# Patient Record
Sex: FEMALE | Race: WHITE | NOT HISPANIC OR LATINO | Employment: FULL TIME | URBAN - METROPOLITAN AREA
[De-identification: names, ages, dates, MRNs, and addresses within clinical notes are randomized per-mention and may not be internally consistent; named-entity substitution may affect disease eponyms.]

---

## 2021-04-16 ENCOUNTER — BULK ORDERING (OUTPATIENT)
Dept: CASE MANAGEMENT | Facility: OTHER | Age: 31
End: 2021-04-16

## 2021-04-16 DIAGNOSIS — Z23 IMMUNIZATION DUE: ICD-10-CM

## 2023-03-27 ENCOUNTER — E-VISIT (OUTPATIENT)
Dept: FAMILY MEDICINE CLINIC | Facility: TELEHEALTH | Age: 33
End: 2023-03-27
Payer: MEDICAID

## 2023-03-27 PROCEDURE — BRIGHTMDVISIT: Performed by: NURSE PRACTITIONER

## 2023-03-27 NOTE — EXTERNAL PATIENT INSTRUCTIONS
View Doctor's Note     Diagnosis   Strep pharyngitis (strep throat)   My name is IMAN Joshua, and I'm a healthcare provider at [facility]. I reviewed your interview, and I see that you have strep throat. This can be a painful condition, but it responds well to treatment.   To prevent the spread of illness to others, I recommend that you stay home and away from other people as much as possible while you're sick. When you need to be around other people, consider wearing a face mask.   I've given you a doctor's note for 1 day.   Medications   Your pharmacy   [facility] [id number] [facility] [id number] [phone]     Prescription   Azithromycin (500mg): Take 1 tablet by mouth once a day for 5 days for infection. This medication is an antibiotic. Take it exactly as directed. You must finish the entire course of medication, even if you feel better after the first few days of treatment.    Start taking the antibiotics I've prescribed right away. You need to finish the entire course of antibiotics, even if you start to feel better before the pills run out.    Some people develop a yeast infection after taking antibiotics. If you get a yeast infection, you can treat it with antifungal creams or suppositories. These are available without a prescription at [facility] and many supermarkets.   About your diagnosis   Strep throat is an infection in the throat and tonsils caused by group A streptococcus bacteria. Strep throat is most often spread by droplets that are released into the air after an infected person coughs or sneezes. You can also get strep throat by sharing cups or utensils with an infected person.   Symptoms usually begin within 2 to 5 days after a person has been exposed. The pain from strep throat usually starts quickly and can be severe, especially when swallowing. Body aches and pains are common.   What to expect   If you follow this treatment plan, you should start to feel  "better within a few days.   When to seek care   Call us at [phone]   with any sudden or unexpected symptoms.    Symptoms that don't improve within 48 hours of starting antibiotics.    Symptoms that get better for a few days and then suddenly get worse.    Worsening fever.    Your throat pain becomes worse and makes swallowing extremely difficult or impossible.    Muffled voice (it may sound like you are talking with a mouthful of food).    Difficulty opening your mouth or jaw.    Stiff neck.    Joint pain, or swelling that moves from joint to joint.    Severe stomach pain.    Shortness of breath.    Nosebleed.    Severe fatigue.    A new rash.    Odd emotional or behavioral changes.    Uncontrollable body movements or \"jerkiness.\"    Severe chest pain.   Other treatment    Rest and drink plenty of water.    Choose throat-friendly liquids and foods, such as lemon tea, broth, applesauce, frozen yogurt, or popsicles.    Gargle with salt water several times a day to help with your throat pain.    Try cough drops and throat lozenges for extra relief.    Stir a teaspoon of honey into warm water or weak tea to help soothe a sore throat.    Avoid smoke and air pollution. Smoke can make infections worse.   Prevention    Avoid close contact with other people when you're sick.    Cover your mouth and nose when you cough or sneeze. Use a tissue or cough into your elbow. Make sure that used tissues go directly into the trash.    Avoid touching your eyes, nose, or mouth while you're sick.    Wash your hands often, especially after coughing, sneezing, or blowing your nose. If soap and water are not available, use an alcohol-based hand .    If you or someone in your home or workplace is sick, disinfect commonly used items. This includes door handles, tables, computers, remotes, and pens.    Coronavirus (COVID-19) information   Common symptoms of COVID-19 include fever, cough, shortness of breath, fatigue, muscle " or body aches, headaches, new loss of sense of taste or smell, sore throat, stuffy or runny nose, nausea or vomiting, and diarrhea. Most people who get COVID-19 have mild symptoms and can rest at home until they get better. Elderly people and those with chronic medical problems may be at risk for more serious complications.   FAQs about the COVID-19 vaccine   There are four authorized COVID-19 vaccines: [employer] Apoorva Vaccine (J&J/Apoorva), Moderna, Novavax, and Pfizer-BioNTech (Pfizer). The J&J/Apoorva and Novavax vaccines are approved for use in people aged 18 and older. The Moderna and Pfizer vaccines are approved for those aged 6 months and older. All four are available at no cost. Even if you don't have health insurance, you can still get the COVID-19 vaccine for free.   Which vaccine is the best? Which vaccine should I get?   All four vaccines are highly effective. Even if you get COVID-19 after being vaccinated, all of the vaccines help prevent severe disease, hospitalization, and complications.   Most people should get whichever vaccine is first available to them. However, women younger than 50 years old should consider the rare risk of blood clots with low platelets after vaccination with the J&J/Apoorva vaccine. This risk hasn't been seen with the other three vaccines.   Are the vaccines safe?   Yes. Hundreds of millions of people in the [place] have already safely received COVID-19 vaccines under the most intense safety monitoring in the history of the [place].   Do I need the vaccine if I've already had COVID?   Yes. Vaccination helps protect you even if you've already had COVID.   If you had COVID-19 and had symptoms, wait to get vaccinated until you've recovered and completed your isolation period.   If you tested positive for COVID-19 but did not have symptoms, you can get vaccinated after 5 full days have passed since you had a positive test, as long as you don't develop symptoms.   How many doses  of the vaccine do I need?   Visit www.cdc.gov/coronavirus/2019-ncov/vaccines/stay-up-to-date.html   to find out how to stay up to date with your COVID-19 vaccines.   I'm immunocompromised. How many doses of the vaccine do I need?   For information on how immunocompromised people can stay up to date with their COVID-19 vaccines, visit www.cdc.gov/coronavirus/2019-ncov/vaccines/recommendations/immuno.html  .   What are the common side effects of the vaccine?   A sore arm, tiredness, headache, and muscle pain may occur within two days of getting the vaccine and last a day or two. For the Moderna or Pfizer vaccines, side effects are more common after the second dose. People over the age of 55 are less likely to have side effects than younger people.   After I'm up to date on vaccines, can I still get or spread COVID?   Yes, you can still get COVID, but your disease should be milder. And your risk of serious illness, hospitalization, and complications will be much lower, especially if you're up to date. Unfortunately, you can still spread COVID if you've been vaccinated. That's why it's important to follow isolation guidelines if you get sick or test positive.   After I'm up to date on vaccines, can I go back to normal?   You should still wear a mask indoors in public if:    It's required by laws, rules, regulations, or local guidance.    You have a weakened immune system.    Your age puts you at increased risk of severe disease.    You have a medical condition that puts you at increased risk of severe disease.    Someone in your household has a weakened immune system, is at increased risk for severe disease, or is unvaccinated.    You're in an area of high transmission.   Where can I get a COVID-19 vaccine?   Visit [facility]'s website for more information. To find a COVID-19 vaccination site near you, visit www.vaccines.gov/  , call [phone]  , or text your zip code to [id number] ([employer]). Message and data rates  may apply.   What about travel?   Travel increases your risk of exposure to COVID-19. For more information, see www.cdc.gov/coronavirus/2019-ncov/travelers/index.html  .   I've had close contact with someone who has COVID. Do I need to quarantine, and if so, for how long?   For the most current answer, including a calculator to determine whether you need to stay home and for how long, visit www.cdc.gov/coronavirus/2019-ncov/your-health/quarantine-isolation.html  .   I've tested positive for COVID. How long do I need to isolate?   For the latest recommendations, including a calculator to determine how long you need to stay home, visit www.cdc.gov/coronavirus/2019-ncov/your-health/quarantine-isolation.html  .   What if I develop symptoms that might be from COVID?   For the latest recommendations on what to do if you're sick, including when to seek emergency care, visit www.cdc.gov/coronavirus/2019-ncov/if-you-are-sick/steps-when-sick.html  .    Flu vaccine information   Who should get a flu vaccine?   Everyone 6 months of age and older should get a yearly flu vaccine.   When should I get vaccinated?   You should get a flu vaccine by the end of October. Once you're vaccinated, it takes about two weeks for antibodies to develop and protect you against the flu. That's why it's important to get vaccinated as soon as possible.   After October, is it too late to get vaccinated?   No. You should still get vaccinated. As long as the flu viruses are still in your community, flu vaccines will remain available, even into January of next year or later.   Why do I need a flu vaccine EVERY year?   Flu viruses are constantly changing, so flu vaccines are usually updated from one season to the next. Your protection from the flu vaccine also lessens over time.   Is the flu vaccine safe?   Yes. Over the last 50 years, hundreds of millions of Americans have safely received the flu vaccines.   What are the side effects of flu vaccines?    You CANNOT get the flu from a flu vaccine. Common side effects of the flu shot include soreness, redness and/or swelling where the shot was given, low grade fever, and aches. Common side effects of the nasal spray flu vaccine for adults include runny nose, headaches, sore throat, and cough. For children, side effects include wheezing, vomiting, muscle aches, and fever.   Does the flu vaccine increase your risk of getting COVID-19?   No. There is no evidence that getting a flu vaccine increases your risk of getting COVID-19.   Is it safe to get the flu vaccine along with a COVID-19 vaccine?   Yes. It's safe to get the flu vaccine with a COVID-19 vaccine or booster.   Contact your healthcare provider TODAY for details on when and where to get your flu vaccine.   Your provider   Your diagnosis was provided by IMAN Joshua, a member of your trusted care team at [facility].   If you have any questions, call us at [phone]  .   View Doctor's Note     Expires on 04/26/23

## 2023-03-27 NOTE — E-VISIT TREATED
Chief Complaint: Cold, flu, COVID, sinus, hay fever, or seasonal allergies   Patient introduction   Patient is 33-year-old female with fever (which may have resolved; see below), sore throat, and headache that started 3 to 5 days ago. Regarding date of symptom onset, patient writes: 03/24/23.   COVID-19 testing history, vaccination status, and exposure:    Has not been tested for COVID-19 since symptom onset.    Patient took a self-test during the interview. Test result was negative.    Received 2 doses of the COVID-19 vaccine (Moderna, Moderna). Received their most recent dose of the vaccine more than 14 days ago.    No known exposure to a person with a confirmed or suspected case of COVID-19.    No travel outside local community within the last 14 days.   Patient requests a 1-day excuse note.   General presentation   Symptoms came on gradually.   Fever:    Has had 1 to 3 days of measured fever.    Has had current measured fever since initial symptom onset.    Highest temperature of 100.4F to 101.5F.   Sinus and nasal symptoms:    No nasal or sinus congestion.    No nasal discharge.    No itchy nose or sneezing.   Throat symptoms:    Sore throat.    Has had recent strep exposure.    Patient thinks they have strep.    Has discomfort when swallowing but can swallow liquids and solid foods.    No muffled voice.   Head and body aches:    Headache described as mild (1 to 3 on a scale of 1 to 10).    No sweats.    No chills.    No myalgia.    No fatigue.   Cough:    No cough.   Wheezing and shortness of breath:    No COPD diagnosis.    No asthma diagnosis.    No wheezing.    No shortness of breath.   Chest pain:    No chest pain.   Ear symptoms:    Current symptoms include pain in the ear(s).   Dizziness:    No dizziness.   Allergies:    Patient has known seasonal allergies.    Patient does not think symptoms are allergy-related.   Flu exposure:    No recent known exposure to a person with a confirmed flu diagnosis.     Has not had a flu vaccine this season.   Not taking any over-the-counter medications for current symptoms.   Review of red flags/alarm symptoms:    No changes in alertness or awareness.    No symptoms suggesting airway obstruction.    No muffled voice.    No symptoms suggesting intracranial hemorrhage.    No fever above 100.4F lasting longer than 4 days.    No decreased urination.   Risk factors for antibiotic resistance:    Close contact with a child in .   Pregnancy/menstrual status/breastfeeding:    Not pregnant.    Not breastfeeding.    Regarding date of last menstrual period, patient writes: Started bleeding on march 13 and haven't really stopped. I just had the mirena IUD placed march 1st. Last period before that was feb 22nd. .   Self-exam:    Height: 177 centimeters    Weight: 68.0 kilograms    No difficulty moving their chin toward their chest.    Tonsillar edema.    Purulent tonsils.    No palatal petechiae.    Able to open mouth normally.    Swollen/painful neck lymph nodes.   Recent antibiotic use:    Has not taken antibiotics for similar symptoms within the past month.   Current medications   Currently taking Vyvanse, topiramate, Mirena, Spironolactone, and Magnesium.   Medication allergies    Corticosteroids.    Fluoroquinolones.    Penicillins.   Medication contraindication review   No history of anaphylactic reaction to beta-lactam antibiotics; aspirin triad; blood dyscrasia; bone marrow depression; catecholamine-releasing paraganglioma; coronary artery disease; coagulation disorder; congenital long QT syndrome; depression; electrolyte abnormalities; fungal infection; GI bleeding; GI obstruction; G6PD deficiency; heart arrhythmia; hypertension; mononucleosis; myasthenia; recent myocardial infarction; NSAID-induced asthma/urticaria; Parkinson's disease; pheochromocytoma; porphyria; Reye syndrome; seizure disorder; ulcerative colitis; and urinary retention.   No history of  metoclopramide-associated dystonic reaction and tardive dyskinesia.   No known history of azithromycin-associated cholestatic jaundice or hepatic impairment.   Past medical history   Immune conditions: No immunocompromising conditions. No history of cancer.   Social history   Never smoked tobacco.   Assessment   Strep pharyngitis. Ruled out: Traumatic laryngitis.   This is the likely diagnosis based on patient's interview responses and symptoms, including:    Sore throat.    No cough.    Current fever    Mild to moderate headache.    Purulent tonsils.    Recent strep exposure.    Swollen/painful neck lymph nodes.   Plan   Medications:    azithromycin 500 mg tablet RX 500mg 1 tab PO qd 5d for infection. This medication is an antibiotic. Take it exactly as directed. You must finish the entire course of medication, even if you feel better after the first few days of treatment. Amount is 5 tab.   The patient's prescription will be sent to:   [id number]   [address] [address]   Phone: [phone]     Fax: [phone]   Other:   Patient was given an excuse note for 1 day.   Education:    Condition and causes    Prevention    Treatment and self-care    When to call provider   ----------   Electronically signed by IMAN Joshua on 2023-03-27 at 11:15AM   ----------   Patient Interview Transcript:   Please carefully consider each question and answer as best you can. This helps your provider give you the best care. Which of these symptoms are bothering you? Select all that apply.    Fever    Sore throat    Headache   Not selected:    Cough    Shortness of breath    Stuffed-up nose or sinuses    Runny nose    Itchy or watery eyes    Itchy nose or sneezing    Loss of smell or taste    Hoarse voice or loss of voice    Sweats    Chills    Muscle or body aches    Fatigue or tiredness    Nausea or vomiting    Diarrhea    I don't have any of these symptoms   Since your current symptoms  "started, have you had a viral test for COVID-19? - This includes home self-tests as well as nose swab or saliva tests done at a doctor's office, lab, or testing site. - It does NOT include antibody tests, which use a blood sample to test for past infection. Select one.    No   Not selected:    Yes   Taking a home COVID test can help your provider give you the best care. If you have a COVID test kit at home, take the test now before continuing with this interview. Do you have a COVID test kit at home? Select one.    Yes, and I'll take a test now   Not selected:    Yes, but I prefer not to take a test now    No, I don't have a test kit   What is the result of the COVID-19 home test you just took? Select one.    Negative   Not selected:    Positive   Have you gotten the COVID-19 vaccine? Select one.    Yes   Not selected:    No   How many total doses of the COVID-19 vaccine have you gotten? This includes boosters as well as additional doses for those who are immunocompromised. Select one.    2 doses   Not selected:    1 dose    3 doses    4 doses    5 doses   1st dose    Moderna   Not selected:    J&J/Apoorva    Novavax    Pfizer   2nd dose    Moderna   Not selected:    J&J/Apoorva    Novavax    Pfizer   When did you get your most recent dose of the COVID-19 vaccine?    More than 14 days ago   Not selected:    Less than 48 hours (2 days) ago    48 to 72 hours (3 days) ago    3 to 5 days ago    5 to 7 days ago    7 to 14 days ago   In the last 14 days, have you traveled outside of your local community? This includes travel by car, RV, bus, train, or plane. Travel increases your chances of getting and spreading COVID-19. Select one.    No   Not selected:    Yes   In the last 14 days, have you had close contact with someone who has coronavirus (COVID-19)? \"Close contact\" means any of these: - Living in the same household as someone with COVID-19. - Caring for someone with COVID-19. - Being within 6 feet of someone with " COVID-19 for a total of at least 15 minutes over a 24-hour period. For example, three 5-minute exposures for a total of 15 minutes. - Being in direct contact with respiratory droplets from someone with COVID-19 (being coughed on, kissing, sharing utensils). Select one.    No, not that I know of   Not selected:    Yes, a confirmed case    Yes, a suspected case   When did your current symptoms start? Select one.    3 to 5 days ago   Not selected:    Less than 48 hours ago    6 to 9 days ago    10 to 14 days ago    2 to 3 weeks ago    3 to 4 weeks ago    More than a month ago   Do you know the exact date your symptoms started? If so, enter the date as MM/DD/YY. Select one.    Yes (specify): 03/24/23   Not selected:    No   Did your symptoms come on suddenly or gradually? Select one.    Gradually   Not selected:    Suddenly    I'm not sure   You mentioned having a fever. Do you have a fever now? Select one.    Yes, and I've had one since my symptoms started   Not selected:    Yes, but I didn't have one when my symptoms started    No, it's gone now   Did you take your temperature with a thermometer? Select one.    Yes   Not selected:    No, but it felt mild    No, but it felt high   What was the highest reading on the thermometer? Select one.    100.4 to 101.5F   Not selected:    Below 100.4F    101.6 to 101.9F    102.0 to 103.0F    Above 103.0F   How long have you had a fever? Select one.    1 to 3 days   Not selected:    Less than 24 hours    4 or more days   You mentioned having a headache. On a scale of 1 to 10, how severe is your headache pain? Select one.    Mild (1 to 3)   Not selected:    Moderate (4 to 6)    Severe (7 to 9)    Unbearable (10)    The worst headache of my life (10+)   Can you swallow liquids and solid foods? A sore throat may be painful when swallowing, but it shouldn't prevent you from swallowing. Select one.    Yes, but it's uncomfortable   Not selected:    Yes, with ease    Yes, but it's  "painful    It's hard to swallow anything because it feels like liquids and food get stuck in my throat    No, I can't swallow anything, liquid or solid foods   Since your symptoms started, have you felt dizzy? Select one.    No   Not selected:    Yes, but I can continue with my regular daily activities    Yes, and it makes it hard to stand, walk, or do daily activities   Do you have chest pain? You might also feel it as discomfort, aching, tightness, or squeezing in the chest. Select one.    No   Not selected:    Yes   Have you urinated at least 3 times in the last 24 hours? Select one.    Yes   Not selected:    No   Changes in alertness or awareness may mean you need emergency care. Since your symptoms started, have you had any of these? Select all that apply.    None of the above   Not selected:    Confusion    Slurred speech    Not knowing where you are or what day it is    Difficulty staying conscious    Fainting or passing out   Do your symptoms include a whistling sound, or wheezing, when you breathe? Select one.    No   Not selected:    Yes    I'm not sure   Do you have any of these symptoms in your ear(s)? Select all that apply.    Pain   Not selected:    Pressure    Fullness    Crackling or popping    Plugged or blocked sensation    None of the above   Can you move your chin toward your chest?    Yes   Not selected:    No, my neck is too stiff   Try opening your mouth as wide as you can. Are you able to open your mouth all the way as you normally would? Select one.    Yes   Not selected:    No   Does your voice sound muffled? \"Muffled\" here does not mean hoarse or scratchy. By \"muffled,\" we mean that it sounds like you're speaking with a mouth full of hot food. Select one.    No, not that I can tell   Not selected:    Yes   Are your tonsils larger than usual?    Yes   Not selected:    No, not that I can tell    I've had my tonsils removed   Is there any white or yellow pus on your tonsils?    Yes   Not " selected:    No, not that I can see   Are there red spots on the roof of your mouth or the back of your throat?    No, not that I can see   Not selected:    Yes   Are your glands/lymph nodes swollen, or does it hurt when you touch them?    Yes   Not selected:    No, not that I can tell   In the past 2 weeks, has anyone around you (such as at school, work, or home) had a confirmed diagnosis of strep throat? A confirmed diagnosis means that a throat swab and lab test were done to verify a strep throat infection. Select one.    Yes   Not selected:    No, not that I know of   Do you think you might have strep throat? Select one.    Yes   Not selected:    No   In the past week, has anyone around you (such as at school, work, or home) had a confirmed diagnosis of the flu? A confirmed diagnosis means that a nose swab was done to verify a flu infection. Select all that apply.    No, not that I know of   Not selected:    I live with someone who has the flu    I've been within touching distance of someone who has the flu    I've walked by, or sat about 3 feet away from, someone who has the flu    I've been in the same building as someone who has the flu   Have you ever been diagnosed with asthma? Select one.    No   Not selected:    Yes   Have you ever been diagnosed with chronic obstructive pulmonary disease (COPD)? Select one.    No, not that I know of   Not selected:    Yes   In the past month, have you taken antibiotics for similar symptoms? Examples of antibiotics include amoxicillin, amoxicillin-clavulanate (Augmentin), penicillin, cefdinir (Omnicef), doxycycline, and clindamycin (Cleocin). Select one.    No   Not selected:    Yes (specify)   Do you have allergies (pollen, dust mites, mold, animal dander)? Select one.    Yes   Not selected:    No, not that I know of   What kind of allergies do you have? Select all that apply.    Seasonal allergies (hay fever)   Not selected:    Pet allergies    Dust allergies    None of  the above    I'm not sure   Do you think your symptoms could be allergy-related? Select one.    No   Not selected:    Yes    I'm not sure   Have you had a flu shot this season? Select one.    No   Not selected:    Yes, less than 2 weeks ago    Yes, 2 to 4 weeks ago    Yes, 1 to 3 months ago    Yes, 3 to 6 months ago    Yes, more than 6 months ago   Are you pregnant? Select one.    No   Not selected:    Yes   When was your last menstrual period? If you don't currently have periods or no longer have periods, please briefly explain.    Started bleeding on  and haven't really stopped. I just had the mirena IUD placed . Last period before that was .   Within the last 2 weeks, have you: - Given birth - Had a miscarriage - Had a pregnancy loss - Had an  Being postpartum (live birth or loss) within the last 2 weeks increases your risk of flu complications. Select one.    No   Not selected:    Yes   Are you breastfeeding? Select one.    No   Not selected:    Yes   The flu and COVID-19 can be more serious for people in certain groups. The next few questions help us figure out if you or anyone you live with is at higher risk for complications from these infections. Do any of these statements apply to you? Select all that apply.    None of the above   Not selected:    I'm     I'm     I'm Black    I'm  or    Do you smoke tobacco? Select one.    No   Not selected:    Yes, every day    Yes, some days    No, I quit   Do you have any of these conditions? Select all that apply.    None of the above   Not selected:    Chronic lung disease, such as cystic fibrosis or interstitial fibrosis    Heart disease, such as congenital heart disease, congestive heart failure, or coronary artery disease    Disorder of the brain, spinal cord, or nerves and muscles, such as dementia, cerebral palsy, epilepsy, muscular dystrophy, or developmental delay    Metabolic disorder or  mitochondrial disease    Cerebrovascular disease, such as stroke or another condition affecting the blood vessels or blood supply to the brain    Down syndrome    Mood disorder, including depression or schizophrenia spectrum disorders    Substance use disorder, such as alcohol, opioid, or cocaine use disorder    Tuberculosis   Do you live in a group care setting? Examples include: - Nursing home - Residential care - Psychiatric treatment facility - Group home - [facility] - Board and care home - Homeless shelter - Foster care setting Select one.    No   Not selected:    Yes   Are you a healthcare worker? Select one.    No   Not selected:    Yes   People with a very high body mass index (BMI) are at higher risk for developing complications from the flu and severe illness from COVID-19. To determine your BMI, we need to know your weight and height. Please enter your weight (in pounds).    Weight   Please enter your height.    Height   Do you have any of these conditions that can affect the immune system? Scroll to see all options. Select all that apply.    None of these   Not selected:    History of bone marrow transplant    Chronic kidney disease    Chronic liver disease (including cirrhosis)    HIV/AIDS    Inflammatory bowel disease (Crohn's disease or ulcerative colitis)    Lupus    Moderate to severe plaque psoriasis    Multiple sclerosis    Rheumatoid arthritis    Sickle cell anemia    Alpha or beta thalassemia    History of solid organ transplant (kidney, liver, or heart)    History of spleen removal    An autoimmune disorder not listed here    A condition requiring treatment with long-term use of oral steroids (such as prednisone, prednisolone, or dexamethasone)   Have you ever been diagnosed with cancer? Select one.    No   Not selected:    Yes, I have cancer now    Yes, but I'm in remission   Do any of these apply to you? Select all that apply.    I'm in close contact with a child in    Not selected:     I've been hospitalized within the last 5 days    I have diabetes    None of the above   The flu and COVID-19 can be more serious for people in certain groups. Do any of these apply to the people who live with you? Select all that apply.    None of the above   Not selected:    Under age 5    Over age 65            Black     or     Pregnant    Has given birth, had a miscarriage, had a pregnancy loss, or had an  in the last 2 weeks   Does any member of your household have any of these medical conditions? Select all that apply.    None of the above   Not selected:    Asthma    Disorders of the brain, spinal cord, or nerves and muscles, such as dementia, cerebral palsy, epilepsy, muscular dystrophy, or developmental delay    Chronic lung disease, such as COPD or cystic fibrosis    Heart disease, such as congenital heart disease, congestive heart failure, or coronary artery disease    Cerebrovascular disease, such as stroke or another condition affecting the blood vessels or blood supply to the brain    Blood disorders, such as sickle cell disease    Diabetes    Metabolic disorders such as inherited metabolic disorders or mitochondrial disease    Kidney disorders    Liver disorders    Weakened immune system due to illness or medications such as chemotherapy or steroids    Children under the age of 19 who are on long-term aspirin therapy    Extreme obesity (BMI > 40)   Do you have any of these conditions? Scroll to see all options. Select all that apply.    None of the above   Not selected:    Aspirin triad (also known as Samter's triad or ASA triad)    Asthma or hives from taking aspirin or other NSAIDs, such as ibuprofen or naproxen    Blockage or narrowing of the blood vessels of the heart    Blood clotting disorder    Blood dyscrasia, such anemia, leukemia, lymphoma, or myeloma    Bone marrow depression    Catecholamine-releasing paraganglioma    Congenital long QT  syndrome    Depression    Difficulty urinating or completely emptying your bladder    Uncorrected electrolyte abnormalities    Fungal infection    Gastrointestinal (GI) bleeding    Gastrointestinal (GI) obstruction    G6PD deficiency    Recent heart attack    High blood pressure    Irregular heartbeat or heart rhythm    Mononucleosis (mono)    Myasthenia gravis    Parkinson's disease    Pheochromocytoma    Reye syndrome    Seizure disorder    Thyroid disease    Ulcerative colitis   Have you ever had either of these conditions? Select all that apply.    No   Not selected:    Metoclopramide-associated dystonic reaction    Tardive dyskinesia   Just a few more questions about medications, and then you're finished. Have you used any non-prescription medications or nasal sprays for your current symptoms? Examples include saline sprays, decongestants, NyQuil, and Tylenol. Select one.    No   Not selected:    Yes   Have you taken any monoamine oxidase inhibitor (MAOI) medications in the last 14 days? Examples include rasagiline (Azilect), selegiline (Eldepryl, Zelapar), isocarboxazid (Marplan), phenelzine (Nardil), and tranylcypromine (Parnate). Select one.    No, not that I know of   Not selected:    Yes   Do you take Kynmobi or Apokyn (apomorphine)? Select one.    No   Not selected:    Yes   Are you taking any other medications, vitamins, or supplements? Select one.    Yes   Not selected:    No   Have you ever had an allergic or bad reaction to any medication? Select one.    Yes   Not selected:    No   Have you had an allergic or bad reaction to any of these medications? Select all that apply.    No, not that I know of   Not selected:    Baloxavir (Xofluza)    Benzonatate (Tessalon Perles)    Fluconazole, itraconazole, or terconazole (brands include Diflucan, Sporanox, Terazol)    Oseltamivir (Tamiflu) or zanamivir (Relenza)    Paxlovid, nirmatrelvir, or ritonavir (Norvir)   Have you had an allergic or bad reaction to any  "of these antibiotic medications? Select all that apply.    Penicillin or any \"-cillin\" antibiotic, such as amoxicillin, ampicillin, dicloxacillin, nafcillin, or piperacillin (Brands include Augmentin, Unasyn, and Zosyn)    Ciprofloxacin or any \"-floxacin\" antibiotic, such as gemifloxacin, levofloxacin, moxifloxacin, or ofloxacin (Brands include Factive, Cipro, Floxin, and Levaquin)   Not selected:    Tetracycline or any \"-cycline\" antibiotic, such as doxycycline, demeclocycline, minocycline (Brands include Declomycin, Doryx, Dynacin, Oracea, Monodox, Panmycin, and Vibramycin)    Cephalexin or any \"cef-\" antibiotic, such as cefazolin, cefdinir, cefuroxime, ceftriaxone, ceftazidime, or cefepime (Brands include Ancef, Ceftin, Fortaz, Keflex, Maxipime, Rocephin, and Simplicef)    Azithromycin or any \"-thromycin\" antibiotic, such as erythromycin or clarithromycin (Brands include Biaxin, Erythrocin, Z-kandis, and Zithromax)    Clindamycin or lincomycin (Brands include Cleocin and Lincocin)    No, not that I know of   Have you had an allergic or bad reaction to any of these medications? Select all that apply.    Corticosteroid (steroid) medication, including topical steroids, inhaled steroids, nasal steroids, or oral steroids (budesonide, ciclesonide, dexamethasone, flunisolide, fluticasone, methylprednisolone, triamcinolone, prednisone (or brand names Alvesco, Deltasone, Flovent, Medrol, Nasacort, Rhinocort, or Veramyst)   Not selected:    Albuterol or a similar medication    Atropine    Metoclopramide (Reglan)    Ondansetron (Zuplenz, Zofran ODT, Zofran)    Prochlorperazine (Compazine)    No, not that I know of   Have you had an allergic or bad reaction to any of these eye drops, nasal sprays, or inhalers? Scroll to see all options. Select all that apply.    No, not that I know of   Not selected:    Azelastine (Astelin, Astepro, Optivar)    Cromolyn (Crolom, NasalCrom)    Ipratropium (Atrovent)    Ketotifen (Alaway, " Zaditor)    Pheniramine/naphazoline (Naphcon-A, Opcon-A, Visine-A)    Olopatadine (Pataday, Patanol, Pazeo)   Have you had an allergic or bad reaction to any of these non-prescription medications? Scroll to see all options. Select all that apply.    No, not that I know of   Not selected:    Acetaminophen (Tylenol)    Aspirin    Cetirizine (Zyrtec)    Dextromethorphan (Delsym, Robitussin, Vicks DayQuil Cough)    Diphenhydramine (Benadryl)    Fexofenadine (Allegra)    Guaifenesin (Mucinex)    Dextromethorphan (Delsym)    Ibuprofen (Advil, Motrin, Midol)    Loratadine (Alavert, Claritin)    Oxymetazoline (Afrin)    Phenylephrine (Sudafed PE)    Pseudoephedrine (Sudafed)   Are you allergic to milk or to the proteins found in milk (for example, whey or casein)? A milk allergy is different from lactose intolerance. Select one.    No, not that I know of   Not selected:    Yes   Have you ever had jaundice or liver problems as a result of taking azithromycin (Zithromax, Zmax)? Jaundice is a condition in which the skin and the whites of the eyes turn yellow. Select all that apply.    No, not that I know of   Not selected:    Yes, jaundice    Yes, liver problems   Do you need a doctor's note? A doctor's note confirms that you received care today and states when you can return to school or work. It does not contain information about your diagnosis or treatment plan. Your provider will make the final decision on whether to give you a doctor's note and for how long. Doctor's notes CANNOT be backdated. We can't provide medical leave paperwork through this type of visit. If more paperwork is needed to request time off, contact your primary care provider. Select one.    Today only (1 day)   Not selected:    Today and tomorrow (2 days)    3 days    5 days    7 days    10 days    14 days    No   Is there anything else you'd like to tell us about your symptoms?   The patient did not enter any additional information.   ----------    Medical history   Medical history data does not currently exist for this patient.

## 2023-10-29 ENCOUNTER — E-VISIT (OUTPATIENT)
Dept: FAMILY MEDICINE CLINIC | Facility: TELEHEALTH | Age: 33
End: 2023-10-29

## 2023-10-29 NOTE — EXTERNAL PATIENT INSTRUCTIONS
Note   If eye symptoms continue, worsen or drainage/crusting continues throughout the day and not just in the morning, recommend a follow up for that complaint.   Diagnosis   Bacterial sinusitis   My name is IMAN Harmon, and I'm a healthcare provider at [facility]. I reviewed your interview, and I see that you have bacterial sinusitis, also known as a bacterial sinus infection.   Medications   Your pharmacy   [facility] [id number] [facility] [id number] [phone]     Prescription   Doxycycline monohydrate (100mg): Take 1 tablet by mouth twice a day for 7 days for infection. This medication is an antibiotic. Take it exactly as directed. You must finish the entire course of medication, even if you feel better after the first few days of treatment.    Start taking the antibiotics I've prescribed right away. You need to finish the entire course of antibiotics, even if you start to feel better before the pills run out.    Some people develop a yeast infection after taking antibiotics. If you get a yeast infection, you can treat it with antifungal creams or suppositories. These are available without a prescription at [employer] and many supermarkets.   About your diagnosis   The sinuses are hollow spaces connected to the nasal passages. Sinusitis occurs when the sinuses swell and block the drainage of fluid and mucus from the nose, causing pain, pressure, and congestion. Fatigue, difficulty sleeping, or decreased appetite may accompany your symptoms.   More than 90% of sinus infections are caused by viruses. However, in certain cases, a sinus infection may be caused by bacteria. Bacterial sinus infections usually look like one of the following cases:    Severe sinus symptoms with a fever over 102F.    Sinus symptoms that have not improved at all after 10 days.    Cold symptoms that slowly improve but then worsen again after 5 or 6 days, usually with a high fever, headache, or nasal  discharge.   What to expect   If you follow this treatment plan, you should start to feel better within a few days.   When to seek care   Call us at [phone]   with any sudden or unexpected symptoms.    Symptoms that last longer than 10 to 14 days.    Symptoms that get better for a few days, and then suddenly get worse.    Fever that measures over 103F or continues for more than 3 days.    Any vision changes.    Your headache worsens.    Stiff neck.    Swelling of your forehead or eyes.    Coughing up red or bloody mucus.    Swallowing becomes extremely difficult or impossible.    More than 5 episodes of diarrhea in a day.    More than 5 episodes of vomiting in a day.    Severe shortness of breath.    Severe chest pain   Other treatment    Rest! Your body needs rest to recover and fight infection.    Drink plenty of water to stay hydrated.    Use steam to soothe your sinuses: Breathe it in from a shower or a bowl of hot water. Placing a warm, moist washcloth over your nose and forehead may help relieve the sinus pain and pressure.    Try non-prescription saline nasal sprays to help your nasal symptoms. Try using a Neti Pot to flush out your stuffy nose and sinuses. Neti Pots are available at any drugstore without a prescription.    Avoid smoke and air pollution. Smoke can make infections worse.   Prevention    Avoid close contact with other people when you're sick.    Cover your mouth and nose when you cough or sneeze. Use a tissue or cough into your elbow. Make sure that used tissues go directly into the trash.    Avoid touching your eyes, nose, or mouth while you're sick.    Wash your hands often, especially after coughing, sneezing, or blowing your nose. If soap and water are not available, use an alcohol-based hand .    If you or someone in your home or workplace is sick, disinfect commonly used items. This includes door handles, tables, computers, remotes, and pens.    Coronavirus (COVID-19)  information   Common symptoms of COVID-19 include fever, cough, shortness of breath, fatigue, muscle or body aches, headaches, new loss of sense of taste or smell, sore throat, stuffy or runny nose, nausea or vomiting, and diarrhea. Most people who get COVID-19 have mild symptoms and can rest at home until they get better. Elderly people and those with chronic medical problems may be at risk for more serious complications.   FAQs about the COVID-19 vaccine   There are four authorized COVID-19 vaccines: [employer] Apoorva Vaccine (J&J/Apoorva), Moderna, Novavax, and Pfizer-BioNTech (Pfizer). The J&J/Apoorva and Novavax vaccines are approved for use in people aged 18 and older. The Moderna and Pfizer vaccines are approved for those aged 6 months and older. All four are available at no cost. Even if you don't have health insurance, you can still get the COVID-19 vaccine for free.   Which vaccine is the best? Which vaccine should I get?   All four vaccines are highly effective. Even if you get COVID-19 after being vaccinated, all of the vaccines help prevent severe disease, hospitalization, and complications.   Most people should get whichever vaccine is first available to them. However, women younger than 50 years old should consider the rare risk of blood clots with low platelets after vaccination with the J&J/Apoorva vaccine. This risk hasn't been seen with the other three vaccines.   Are the vaccines safe?   Yes. Hundreds of millions of people in the [place] have already safely received COVID-19 vaccines under the most intense safety monitoring in the history of the [place].   Do I need the vaccine if I've already had COVID?   Yes. Vaccination helps protect you even if you've already had COVID.   If you had COVID-19 and had symptoms, wait to get vaccinated until you've recovered and completed your isolation period.   If you tested positive for COVID-19 but did not have symptoms, you can get vaccinated after 5 full  days have passed since you had a positive test, as long as you don't develop symptoms.   How many doses of the vaccine do I need?   Visit www.cdc.gov/coronavirus/2019-ncov/vaccines/stay-up-to-date.html   to find out how to stay up to date with your COVID-19 vaccines.   I'm immunocompromised. How many doses of the vaccine do I need?   For information on how immunocompromised people can stay up to date with their COVID-19 vaccines, visit www.cdc.gov/coronavirus/2019-ncov/vaccines/recommendations/immuno.html  .   What are the common side effects of the vaccine?   A sore arm, tiredness, headache, and muscle pain may occur within two days of getting the vaccine and last a day or two. For the Moderna or Pfizer vaccines, side effects are more common after the second dose. People over the age of 55 are less likely to have side effects than younger people.   After I'm up to date on vaccines, can I still get or spread COVID?   Yes, you can still get COVID, but your disease should be milder. And your risk of serious illness, hospitalization, and complications will be much lower, especially if you're up to date. Unfortunately, you can still spread COVID if you've been vaccinated. That's why it's important to follow isolation guidelines if you get sick or test positive.   After I'm up to date on vaccines, can I go back to normal?   You should still wear a mask indoors in public if:    It's required by laws, rules, regulations, or local guidance.    You have a weakened immune system.    Your age puts you at increased risk of severe disease.    You have a medical condition that puts you at increased risk of severe disease.    Someone in your household has a weakened immune system, is at increased risk for severe disease, or is unvaccinated.    You're in an area of high transmission.   Where can I get a COVID-19 vaccine?   Visit [facility]'s website for more information. To find a COVID-19 vaccination site near you, visit  www.vaccines.gov/  , call [phone]  , or text your zip code to 552132 ([employer] Message and data rates may apply.   What about travel?   Travel increases your risk of exposure to COVID-19. For more information, see www.cdc.gov/coronavirus/2019-ncov/travelers/index.html  .   I've had close contact with someone who has COVID. Do I need to quarantine, and if so, for how long?   For the most current answer, including a calculator to determine whether you need to stay home and for how long, visit www.cdc.gov/coronavirus/2019-ncov/your-health/quarantine-isolation.html  .   I've tested positive for COVID. How long do I need to isolate?   For the latest recommendations, including a calculator to determine how long you need to stay home, visit www.cdc.gov/coronavirus/2019-ncov/your-health/quarantine-isolation.html  .   What if I develop symptoms that might be from COVID?   For the latest recommendations on what to do if you're sick, including when to seek emergency care, visit www.cdc.gov/coronavirus/2019-ncov/if-you-are-sick/steps-when-sick.html  .    Flu vaccine information   Who should get a flu vaccine?   Everyone 6 months of age and older should get a yearly flu vaccine.   When should I get vaccinated?   You should get a flu vaccine by the end of October. Once you're vaccinated, it takes about two weeks for antibodies to develop and protect you against the flu. That's why it's important to get vaccinated as soon as possible.   After October, is it too late to get vaccinated?   No. You should still get vaccinated. As long as the flu viruses are still in your community, flu vaccines will remain available, even into January of next year or later.   Why do I need a flu vaccine EVERY year?   Flu viruses are constantly changing, so flu vaccines are usually updated from one season to the next. Your protection from the flu vaccine also lessens over time.   Is the flu vaccine safe?   Yes. Over the last 50 years, hundreds  of millions of Americans have safely received the flu vaccines.   What are the side effects of flu vaccines?   You CANNOT get the flu from a flu vaccine. Common side effects of the flu shot include soreness, redness and/or swelling where the shot was given, low grade fever, and aches. Common side effects of the nasal spray flu vaccine for adults include runny nose, headaches, sore throat, and cough. For children, side effects include wheezing, vomiting, muscle aches, and fever.   Does the flu vaccine increase your risk of getting COVID-19?   No. There is no evidence that getting a flu vaccine increases your risk of getting COVID-19.   Is it safe to get the flu vaccine along with a COVID-19 vaccine?   Yes. It's safe to get the flu vaccine with a COVID-19 vaccine or booster.   Contact your healthcare provider TODAY for details on when and where to get your flu vaccine.   Your provider   Your diagnosis was provided by IMAN Harmon, a member of your trusted care team at [facility].   If you have any questions, call us at [phone]  .

## 2023-10-29 NOTE — E-VISIT TREATED
Chief Complaint: Cold, flu, COVID, sinus, hay fever, or seasonal allergies   Patient introduction   Patient is 33-year-old female with congestion, itchy or watery eyes, and headache that started 6 to 9 days ago. Regarding date of symptom onset, patient writes: 10/16/2023.   COVID-19 testing history, vaccination status, and exposure:    Patient did a self-test 7 to 14 days ago. Test result was negative.    Patient was prompted to take a self-test during the interview, but elected not to test now.    Received 2 doses of the COVID-19 vaccine (Moderna, Moderna). Received their most recent dose of the vaccine more than 14 days ago.    No high-risk (household) exposure to COVID-19 within the last 14 days.    No travel outside local community within the last 14 days.   Risk factors for severe disease from COVID-19 infection    Healthcare worker.    Tuberculosis.    Asthma.   General presentation   Partial improvement of symptoms. Symptoms came on gradually.   Fever:    No fever.   Sinus and nasal symptoms:    Yellow nasal drainage.    Nasal drainage is thick.    Postnasal drip.    1 to 3 episodes of antibiotic treatment for sinus infection in the last year.    Sinus pain or pressure on or around the forehead and eyes.    Patient first noticed sinus pain 5 to 9 days ago.    Sinus pain is not worse with Valsalva.    No nasal discharge.    No itchy nose or sneezing.    No history of unhealed nasal septal ulcer/nasal wound.    No history of deviated septum or nasal polyps.   Throat symptoms:    No sore throat.   Head and body aches:    Headache described as mild (1 to 3 on a scale of 1 to 10).    No sweats.    No chills.    No myalgia.    No fatigue.   Cough:    No cough.   Wheezing and shortness of breath:    Has asthma diagnosis.    Using an albuterol inhaler for asthma.    Using albuterol every 6 hours or longer.    No COPD diagnosis.    No wheezing.    No shortness of breath.    Current cold symptoms do not aggravate  their asthma.   Chest pain:    No chest pain.   Ear symptoms:    Current symptoms include fullness and crackling or popping in the ear(s).   Dizziness:    No dizziness.   Allergies:    Patient has known seasonal allergies.   Flu exposure:    No recent known exposure to a person with a confirmed flu diagnosis.    Received a flu vaccine in the last 1 to 3 months.   Patient is taking over-the-counter medications for current symptoms, including acetaminophen, cetirizine, fluticasone, and ibuprofen.   Review of red flags/alarm symptoms:    No changes in alertness or awareness.    No symptoms suggesting respiratory distress.    No symptoms suggesting intracranial hemorrhage.    No decreased urination.   Risk factors for antibiotic resistance:    Antibiotic use for similar symptoms within the last 30 days.   Pregnancy/menstrual status/breastfeeding:    Not pregnant.    Not breastfeeding.    Regarding date of last menstrual period, patient writes: I have the mirena IUD I don't generally get my period.   Self-exam:    Height: 177 centimeters    Weight: 65.3 kilograms    Neck lymph nodes feel normal.    Mild periorbital edema.   Recent antibiotic use:    Has taken antibiotics for similar symptoms within the past month. Patient specifies the antibiotics taken as Z pack.   Current medications   Currently taking clonazePAM 0.5 MG tablet, Sprintec 28 0.25-35 MG-MCG per tablet, Etonogestrel 68 MG implant subdermal implant, topiramate 50 MG tablet, azithromycin 250 MG tablet, sprinolactone , Topiramate, magnesium , and probiotics.   Medication allergies    Fluoroquinolones    Penicillins (reaction: swelling of the mouth, eyes, lips, or tongue, breathing difficulties, and a fast heartbeat)   Medication contraindication review   No history of anaphylactic reaction to beta-lactam antibiotics; aspirin triad; blood dyscrasia; bone marrow depression; catecholamine-releasing paraganglioma; coronary artery disease; coagulation disorder;  congenital long QT syndrome; depression; electrolyte abnormalities; fungal infection; GI bleeding; GI obstruction; G6PD deficiency; heart arrhythmia; hypertension; mononucleosis; myasthenia; recent myocardial infarction; NSAID-induced asthma/urticaria; Parkinson's disease; pheochromocytoma; porphyria; Reye syndrome; seizure disorder; ulcerative colitis; and urinary retention.   No history of metoclopramide-associated dystonic reaction and tardive dyskinesia.   No known history of azithromycin-associated cholestatic jaundice or hepatic impairment.   Past medical history   Immune conditions: No immunocompromising conditions.   No history of cancer.   Social history   Never smoked tobacco.   Patient-submitted comments   Patient was asked if they had anything to add about their symptoms. Patient writes: The red swollen eyes with very mild discharge when I wake up are a bother and I can't breathe well out of left nostril more or less have most pressure on left side.   Patient did not request an excuse note.   Assessment   Bacterial sinusitis.   This is the likely diagnosis based on patient's interview responses, including:    Symptom profile    Duration of symptoms longer than 5 days   Plan   Medications:    doxycycline monohydrate 100 mg tablet RX 100mg 1 tab PO bid 7d for infection. This medication is an antibiotic. Take it exactly as directed. You must finish the entire course of medication, even if you feel better after the first few days of treatment. Amount is 14 tab.   The patient's prescription will be sent to:   [facility] [id number]   [id number] [address]   Phone: [phone]     Fax: [phone]   Education:    Condition and causes    Prevention    Treatment and self-care    When to call provider   ----------   Electronically signed by IMAN Harmon on 2023-10-29 at 09:27AM   ----------   Patient Interview Transcript:   Which of these symptoms are bothering you? Select all that  apply.    Stuffed-up nose or sinuses    Itchy or watery eyes    Headache   Not selected:    Cough    Shortness of breath    Runny nose    Itchy nose or sneezing    Loss of smell or taste    Sore throat    Hoarse voice or loss of voice    Fever    Sweats    Chills    Muscle or body aches    Fatigue or tiredness    Nausea or vomiting    Diarrhea    I don't have any of these symptoms   When did your current symptoms start? Select one.    6 to 9 days ago   Not selected:    Less than 48 hours ago    3 to 5 days ago    10 to 14 days ago    2 to 3 weeks ago    3 to 4 weeks ago    More than a month ago   Do you know the exact date your symptoms started? If so, enter the date as MM/DD/YY. Select one.    Yes (specify): 10/16/2023   Not selected:    No   Did your symptoms come on suddenly or gradually? Select one.    Gradually   Not selected:    Suddenly    I'm not sure   Have your symptoms improved at all since they began? Select one.    Yes, but they haven't gone away completely   Not selected:    Yes, but then they came back worse than before    No   Since your current symptoms started, have you had a viral test for COVID-19? - This includes home self-tests as well as nose swab or saliva tests done at a doctor's office, lab, or testing site. - It does NOT include antibody tests, which use a blood sample to test for past infection. Select one.    Yes   Not selected:    No   When was your most recent COVID-19 test? Select one.    7 to 14 days ago   Not selected:    Within the last 24 hours    Within the last week (specify date as MM/DD/YY)    15 to 30 days ago    More than 1 month ago   What type of COVID-19 test did you most recently have? Select one.    Viral self-test or home test   Not selected:    Viral test at a doctor's office, lab, or testing site   What was the result of your most recent COVID-19 test? Select one.    Negative   Not selected:    Positive   Even though your last test was negative, you could still have  COVID. You may have tested before the virus was detectable. In some cases, repeat testing over several days is needed. - If you have a COVID test kit, take the test now before continuing this interview. - If you choose not to take a test or don't have one, you should still continue this interview. Your provider can still help you get care. Do you have a COVID test kit? Select one.    Yes, but I prefer not to take a test now   Not selected:    Yes, and I'll take another test now    No, I don't have a test kit   Has anyone in your household tested positive for COVID-19 in the past 14 days? Select one.    No   Not selected:    Yes   Have you gotten the COVID-19 vaccine? Select one.    Yes   Not selected:    No   How many total doses of the COVID-19 vaccine have you gotten? This includes boosters as well as additional doses for those who are immunocompromised. Select one.    2 doses   Not selected:    1 dose    3 doses    4 doses    5 doses    6 doses   1st dose    Moderna   Not selected:    J&J/ZenMate    Novavax    Pfizer   2nd dose    Moderna   Not selected:    J&J/Apoorva    Novavax    Pfizer   When did you get your most recent dose of the COVID-19 vaccine?    More than 14 days ago   Not selected:    Less than 48 hours (2 days) ago    48 to 72 hours (3 days) ago    3 to 5 days ago    5 to 7 days ago    7 to 14 days ago   In the last 14 days, have you traveled outside of your local community? This includes travel by car, RV, bus, train, or plane. Travel increases your chances of getting and spreading COVID-19. Select one.    No   Not selected:    Yes   You mentioned having a headache. On a scale of 1 to 10, how severe is your headache pain? Select one.    Mild (1 to 3)   Not selected:    Moderate (4 to 6)    Severe (7 to 9)    Unbearable (10)    The worst headache of my life (10+)   Do you feel sinus pain or pressure in any of these areas?    In my forehead    Around my eyes   Not selected:    Behind my nose    In my  "cheeks    In my upper teeth or jaw    No   When did you first notice your sinus pain or pressure? Select one.    5 to 9 days ago   Not selected:    Less than 5 days ago    10 to 14 days ago    2 to 4 weeks ago    1 month ago or longer   Does coughing, sneezing, or leaning forward make your sinuses feel worse? Select one.    No   Not selected:    Yes   What color is your nasal drainage? Select one.    Yellow or yellowish   Not selected:    Clear    White    Green or greenish    My nose is stuffed but not draining or running   Is your nasal drainage thick or thin? Select one.    Thick   Not selected:    Thin   Is there any drainage (mucus) going down the back of your throat? This kind of drainage is also called \"postnasal drip.\" It can cause frequent throat clearing. Select one.    Yes   Not selected:    No, not that I know of   Since your symptoms started, have you felt dizzy? Select one.    No   Not selected:    Yes, but I can continue with my regular daily activities    Yes, and it makes it hard to stand, walk, or do daily activities   Do you have chest pain? You might also feel it as discomfort, aching, tightness, or squeezing in the chest. Select one.    No   Not selected:    Yes   Have you urinated at least 3 times in the last 24 hours? Select one.    Yes   Not selected:    No   Changes in alertness or awareness may mean you need emergency care. Since your symptoms started, have you had any of these? Select all that apply.    None of the above   Not selected:    Confusion    Slurred speech    Not knowing where you are or what day it is    Difficulty staying conscious    Fainting or passing out   Do your symptoms include a whistling sound, or wheezing, when you breathe? Select one.    No   Not selected:    Yes   Are your eyelids or the areas around your eyes puffy? Select one.    Yes, but I can easily open my eye(s)   Not selected:    Yes, and it's hard to open my eye(s)    Yes, and my eye(s) are completely " swollen shut    No   Do you have any of these symptoms in your ear(s)? Select all that apply.    Fullness    Crackling or popping   Not selected:    Pain    Pressure    Plugged or blocked sensation    None of the above   Are your glands/lymph nodes swollen, or does it hurt when you touch them?    No, not that I can tell   Not selected:    Yes   In the past week, has anyone around you (such as at school, work, or home) had a confirmed diagnosis of the flu? A confirmed diagnosis means that a nose swab was done to verify a flu infection. Select all that apply.    No, not that I know of   Not selected:    I live with someone who has the flu    I've been within touching distance of someone who has the flu    I've walked by, or sat about 3 feet away from, someone who has the flu    I've been in the same building as someone who has the flu   Have you ever been diagnosed with asthma? Select one.    Yes   Not selected:    No   Are your cold symptoms making your asthma worse? Select one.    No   Not selected:    Yes   Are you currently using any medications or inhalers for your asthma? Select one.    Yes   Not selected:    No    I'm supposed to, but I ran out   What medications or inhalers are you currently using for your asthma? Select all that apply.    Albuterol inhaler, as needed (such as ProAir, Proventil, Ventolin)   Not selected:    Inhaled steroid (such as Asmanex, Qvar, Pulmicort, Flovent)    Inhaled steroid with long-acting bronchodilator (such as Advair, Dulera, Symbicort)    Leukotriene modifier (such as Singulair)    Oral steroids (such as prednisone)    Other (specify)   How often are you using albuterol? If you're using albuterol very frequently, you'll need to be seen in person. Select one.    Every 6 hours or longer   Not selected:    More than once an hour    Every 1 to 2 hours    Every 2 to 3 hours    Every 3 to 4 hours    Every 4 to 6 hours   Do you need a refill of albuterol? Select one.    No   Not  selected:    Yes   Have you ever been diagnosed with chronic obstructive pulmonary disease (COPD)? Select one.    No, not that I know of   Not selected:    Yes   In the past month, have you taken antibiotics for similar symptoms? Examples of antibiotics include amoxicillin, amoxicillin-clavulanate (Augmentin), penicillin, cefdinir (Omnicef), doxycycline, and clindamycin (Cleocin). Select one.    Yes (specify): Z pack   Not selected:    No   In the last year, how many times were you treated with antibiotics for a sinus infection? Select one.    1 to 3 times   Not selected:    None    4 or more times   Have you been diagnosed with a deviated septum or nasal polyps? The nose is divided into two nostrils by the septum. A crooked septum is called a deviated septum. Nasal polyps are growths inside the nose or sinuses. Select one.    No, not that I know of   Not selected:    Yes, but I had surgery to treat them    Yes, I have a deviated septum    Yes, I have nasal polyps    Yes, I have a deviated septum and nasal polyps   Do you have a sore inside your nose that won't heal? Select one.    No, not that I know of   Not selected:    Yes   Do you have allergies (pollen, dust mites, mold, animal dander)? Select one.    Yes   Not selected:    No, not that I know of   What kind of allergies do you have? Select all that apply.    Seasonal allergies (hay fever)   Not selected:    Perennial, or year-round, allergies (hay fever)    Pet allergies    Dust allergies    None of the above    I'm not sure   Do you think your symptoms could be allergy-related? Select one.    I'm not sure   Not selected:    Yes    No   Have you had a flu shot this season? Select one.    Yes, 1 to 3 months ago   Not selected:    Yes, less than 2 weeks ago    Yes, 2 to 4 weeks ago    Yes, 3 to 6 months ago    Yes, more than 6 months ago    No   Are you pregnant? Select one.    No   Not selected:    Yes   When was your last menstrual period? If you don't  currently have periods or no longer have periods, please briefly explain.    I have the mirena IUD I don't generally get my period   Within the last 2 weeks, have you: - Given birth - Had a miscarriage - Had a pregnancy loss - Had an  Being postpartum (live birth or loss) within the last 2 weeks increases your risk of flu complications. Select one.    No   Not selected:    Yes   Are you breastfeeding? Select one.    No   Not selected:    Yes   The flu and COVID-19 can be more serious for people in certain groups. The next few questions help us figure out if you or anyone you live with is at higher risk for complications from these infections. Do any of these statements apply to you? Select all that apply.    None of the above   Not selected:    I'm     I'm     I'm Black    I'm  or    Do you smoke tobacco? Select one.    No   Not selected:    Yes, every day    Yes, some days    No, I quit   Do you have any of these conditions? Select all that apply.    Tuberculosis   Not selected:    Chronic lung disease, such as cystic fibrosis or interstitial fibrosis    Heart disease, such as congenital heart disease, congestive heart failure, or coronary artery disease    Disorder of the brain, spinal cord, or nerves and muscles, such as dementia, cerebral palsy, epilepsy, muscular dystrophy, or developmental delay    Metabolic disorder or mitochondrial disease    Cerebrovascular disease, such as stroke or another condition affecting the blood vessels or blood supply to the brain    Down syndrome    Mood disorder, including depression or schizophrenia spectrum disorders    Substance use disorder, such as alcohol, opioid, or cocaine use disorder    None of the above   Do you live in a group care setting? Examples include: - Nursing home - Residential care - Psychiatric treatment facility - [facility] - Homeless shelter - Foster care setting Select  one.    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 To determine your BMI, we need to know your weight and height. Please enter your weight (in pounds).    Weight   Please enter your height.    Height   Do you have any of these conditions that can affect the immune system? Scroll to see all options. Select all that apply.    None of these   Not selected:    History of bone marrow transplant    Chronic kidney disease    Chronic liver disease (including cirrhosis)    HIV/AIDS    Inflammatory bowel disease (Crohn's disease or ulcerative colitis)    Lupus    Moderate to severe plaque psoriasis    Multiple sclerosis    Rheumatoid arthritis    Sickle cell anemia    Alpha or beta thalassemia    History of solid organ transplant (kidney, liver, or heart)    History of spleen removal    An autoimmune disorder not listed here (specify)    A condition requiring treatment with long-term use of oral steroids (such as prednisone, prednisolone, or dexamethasone) (specify)   Have you ever been diagnosed with cancer? Select one.    No   Not selected:    Yes, I have cancer now    Yes, but I'm in remission   Do any of these apply to you? Select all that apply.    None of the above   Not selected:    I've been hospitalized within the last 5 days    I have diabetes    I'm in close contact with a child in    The flu and COVID-19 can be more serious for people in certain groups. Do any of these apply to the people who live with you? Select all that apply.    None of the above   Not selected:    Under age 5    Over age 65            Black     or     Pregnant    Has given birth, had a miscarriage, had a pregnancy loss, or had an  in the last 2 weeks   Does any member of your household have  any of these medical conditions? Select all that apply.    None of the above   Not selected:    Asthma    Disorders of the brain, spinal cord, or nerves and muscles, such as dementia, cerebral palsy, epilepsy, muscular dystrophy, or developmental delay    Chronic lung disease, such as COPD or cystic fibrosis    Heart disease, such as congenital heart disease, congestive heart failure, or coronary artery disease    Cerebrovascular disease, such as stroke or another condition affecting the blood vessels or blood supply to the brain    Blood disorders, such as sickle cell disease    Diabetes    Metabolic disorders such as inherited metabolic disorders or mitochondrial disease    Kidney disorders    Liver disorders    Weakened immune system due to illness or medications such as chemotherapy or steroids    Children under the age of 19 who are on long-term aspirin therapy    Extreme obesity (BMI > 40)   Do you have any of these conditions? Scroll to see all options. Select all that apply.    None of the above   Not selected:    Aspirin triad (also known as Samter's triad or ASA triad)    Asthma or hives from taking aspirin or other NSAIDs, such as ibuprofen or naproxen    Blockage or narrowing of the blood vessels of the heart    Blood clotting disorder    Blood dyscrasia, such anemia, leukemia, lymphoma, or myeloma    Bone marrow depression    Catecholamine-releasing paraganglioma    Congenital long QT syndrome    Depression    Difficulty urinating or completely emptying your bladder    Uncorrected electrolyte abnormalities    Fungal infection    Gastrointestinal (GI) bleeding    Gastrointestinal (GI) obstruction    G6PD deficiency    Recent heart attack    High blood pressure    Irregular heartbeat or heart rhythm    Mononucleosis (mono)    Myasthenia gravis    Parkinson's disease    Pheochromocytoma    Reye syndrome    Seizure disorder    Thyroid disease    Ulcerative colitis   Have you ever had either of these  conditions? Select all that apply.    No   Not selected:    Metoclopramide-associated dystonic reaction    Tardive dyskinesia   Just a few more questions about medications, and then you're finished. Have you used any non-prescription medications or nasal sprays for your current symptoms? Examples include saline sprays, decongestants, NyQuil, and Tylenol. Select one.    Yes   Not selected:    No   Which of these non-prescription medications have you tried? Scroll to see all options. Select all that apply.    Acetaminophen (Tylenol)    Cetirizine (Zyrtec)    Fluticasone (Flonase)    Ibuprofen (Advil, Motrin, Midol)   Not selected:    Budesonide (Rhinocort)    Chlorpheniramine (Aller-chlor, Chlor-Trimeton)    Cromolyn (NasalCrom)    Dextromethorphan (Delsym, Robitussin, Vicks DayQuil Cough)    Diphenhydramine (Benadryl)    Fexofenadine (Allegra)    Guaifenesin (Mucinex)    Guaifenesin/dextromethorphan (Delsym DM, Mucinex DM, Robitussin DM)    Ketotifen (Alaway, Zaditor)    Loratadine (Alavert, Claritin)    Naphazoline-pheniramine (Naphcon-A, Opcon-A, Visine-A)    Omeprazole (Prilosec)    Oxymetazoline (Afrin)    Phenylephrine (Sudafed PE)    Triamcinolone (Nasacort)    None of the above   Have you taken any monoamine oxidase inhibitor (MAOI) medications in the last 14 days? Examples include rasagiline (Azilect), selegiline (Eldepryl, Zelapar), isocarboxazid (Marplan), phenelzine (Nardil), and tranylcypromine (Parnate). Select one.    No, not that I know of   Not selected:    Yes   Do you take Kynmobi or Apokyn (apomorphine)? Select one.    No   Not selected:    Yes   Are you still taking these medications listed in your medical record? If you're not taking any of these, click Next. Select all that apply.    clonazePAM 0.5 MG tablet    Sprintec 28 0.25-35 MG-MCG per tablet    Etonogestrel 68 MG implant subdermal implant    topiramate 50 MG tablet    azithromycin 250 MG tablet   Are you taking any other medications,  "vitamins, or supplements? Select one.    Yes   Not selected:    No   Have you ever had an allergic or bad reaction to any medication? Select one.    Yes   Not selected:    No   Have you had an allergic or bad reaction to any of these medications? Select all that apply.    No, not that I know of   Not selected:    Baloxavir (Xofluza)    Benzonatate (Tessalon Perles)    Fluconazole, itraconazole, or terconazole (brands include Diflucan, Sporanox, Terazol)    Oseltamivir (Tamiflu) or zanamivir (Relenza)    Paxlovid, nirmatrelvir, or ritonavir (Norvir)   Have you had an allergic or bad reaction to any of these antibiotic medications? Select all that apply.    Penicillin or any \"-cillin\" antibiotic, such as amoxicillin, ampicillin, dicloxacillin, nafcillin, or piperacillin (Brands include Augmentin, Unasyn, and Zosyn)    Ciprofloxacin or any \"-floxacin\" antibiotic, such as gemifloxacin, levofloxacin, moxifloxacin, or ofloxacin (Brands include Factive, Cipro, Floxin, and Levaquin)   Not selected:    Tetracycline or any \"-cycline\" antibiotic, such as doxycycline, demeclocycline, minocycline (Brands include Declomycin, Doryx, Dynacin, Oracea, Monodox, Panmycin, and Vibramycin)    Cephalexin or any \"cef-\" antibiotic, such as cefazolin, cefdinir, cefuroxime, ceftriaxone, ceftazidime, or cefepime (Brands include Ancef, Ceftin, Fortaz, Keflex, Maxipime, Rocephin, and Simplicef)    Azithromycin or any \"-thromycin\" antibiotic, such as erythromycin or clarithromycin (Brands include Biaxin, Erythrocin, Z-kandis, and Zithromax)    Clindamycin or lincomycin (Brands include Cleocin and Lincocin)    No, not that I know of   When you had an allergic or bad reaction to penicillin or another \"-cillin\" antibiotic, did you have any of these symptoms? Select all that apply.    Swelling of the mouth, eyes, lips, or tongue    Breathing difficulties    A fast heartbeat   Not selected:    Raised, red, itchy spots with each spot lasting less than 24 " hours    Blisters or ulcers on the lips, mouth, eyes, or vagina    Peeling skin    Feeling light-headed or faint    Clammy skin    Confusion and anxiety    Collapsing or losing consciousness    Joint pains    Problems with kidneys, lungs, or liver    None of the above   Have you had an allergic or bad reaction to any of these medications? Select all that apply.    No, not that I know of   Not selected:    Albuterol or a similar medication    Atropine    Corticosteroid (steroid) medication, including topical steroids, inhaled steroids, nasal steroids, or oral steroids (budesonide, ciclesonide, dexamethasone, flunisolide, fluticasone, methylprednisolone, triamcinolone, prednisone (or brand names Alvesco, Deltasone, Flovent, Medrol, Nasacort, Rhinocort, or Veramyst)    Metoclopramide (Reglan)    Ondansetron (Zuplenz, Zofran ODT, Zofran)    Prochlorperazine (Compazine)   Have you had an allergic or bad reaction to any of these eye drops, nasal sprays, or inhalers? Scroll to see all options. Select all that apply.    No, not that I know of   Not selected:    Azelastine (Astelin, Astepro, Optivar)    Cromolyn (Crolom, NasalCrom)    Ipratropium (Atrovent)    Ketotifen (Alaway, Zaditor)    Pheniramine/naphazoline (Naphcon-A, Opcon-A, Visine-A)    Olopatadine (Pataday, Patanol, Pazeo)   Have you had an allergic or bad reaction to any of these non-prescription medications? Scroll to see all options. Select all that apply.    No, not that I know of   Not selected:    Acetaminophen (Tylenol)    Aspirin    Cetirizine (Zyrtec)    Dextromethorphan (Delsym, Robitussin, Vicks DayQuil Cough)    Diphenhydramine (Benadryl)    Fexofenadine (Allegra)    Guaifenesin (Mucinex)    Dextromethorphan (Delsym)    Ibuprofen (Advil, Motrin, Midol)    Loratadine (Alavert, Claritin)    Oxymetazoline (Afrin)    Phenylephrine (Sudafed PE)    Pseudoephedrine (Sudafed)   Are you allergic to milk or to the proteins found in milk (for example, whey or  casein)? A milk allergy is different from lactose intolerance. Select one.    No, not that I know of   Not selected:    Yes   Have you ever had jaundice or liver problems as a result of taking azithromycin (Zithromax, Zmax)? Jaundice is a condition in which the skin and the whites of the eyes turn yellow. Select all that apply.    No, not that I know of   Not selected:    Yes, jaundice    Yes, liver problems   Do you need a doctor's note? A doctor's note confirms that you received care today and states when you can return to school or work. It does not contain information about your diagnosis or treatment plan. Your provider will make the final decision on whether to give you a doctor's note and for how long. Doctor's notes CANNOT be backdated. We can't provide medical leave paperwork through this type of visit. If more paperwork is needed to request time off, contact your primary care provider. Select one.    No   Not selected:    Today only (1 day)    Today and tomorrow (2 days)    3 days    5 days    7 days    10 days    14 days   Is there anything you'd like to add about your symptoms? Please limit your comments to the symptoms asked about in this interview. If you include comments about other concerns, your provider may recommend that you be seen in person.    The red swollen eyes with very mild discharge when I wake up are a bother and I can't breathe well out of left nostril more or less have most pressure on left side   ----------   Medical history   The following information was received from the EMR on October 29, 2023.   Allergies:    PENICILLINS   - Allergy Type: Medication   - Reaction:   - Severity: None   - Clinical Status: Active   - Verification Status: Confirmed    CIPROFLOXACIN   - Allergy Type: Medication   - Reaction: Other (See Comments)   - Severity: None   - Clinical Status: Active   - Verification Status: Confirmed    ERYTHROMYCIN   - Allergy Type: Medication   - Reaction:   - Severity:  None   - Clinical Status: Active   - Verification Status: Confirmed    SULFA ANTIBIOTICS   - Allergy Type: Medication   - Reaction: Rash   - Severity: Low   - Clinical Status: Active   - Verification Status: Confirmed   Medications:    lisdexamfetamine (VYVANSE) capsule   - Route: Oral   - Start Date: February 03, 2020   - End Date: None   - Status: Active    clonazePAM (KlonoPIN) tablet   - Route:   - Start Date: June 22, 2020   - End Date: None   - Status: Active    SPRINTEC 28 0.25-35 MG-MCG PO TABS   - Route:   - Start Date: June 22, 2020   - End Date: None   - Status: Active    ETONOGESTREL 68 MG SC IMPL   - Route: Intradermal   - Start Date: March 03, 2020   - End Date: None   - Status: Active    topiramate (TOPAMAX) tablet   - Route: Oral   - Start Date: February 06, 2017   - End Date: None   - Status: Active    azithromycin (ZITHROMAX) tablet   - Route:   - Start Date: June 22, 2020   - End Date: None   - Status: Active   Problem list:    Abdominal pain   - Category: Problem List Item   - Health Status:   - Start Date: February 06, 2017   - End Date: None   - Status: Active    Appendicitis   - Category: Problem List Item   - Health Status:   - Start Date: February 06, 2017   - End Date: None   - Status: Active    Heartburn   - Category: Problem List Item   - Health Status:   - Start Date: February 06, 2017   - End Date: None   - Status: Active    Nausea and vomiting   - Category: Problem List Item   - Health Status:   - Start Date: February 06, 2017   - End Date: None   - Status: Active    Abnormal weight loss   - Category: Problem List Item   - Health Status:   - Start Date: February 06, 2017   - End Date: None   - Status: Active

## 2023-11-04 RX ORDER — GUAIFENESIN 600 MG/1
600 TABLET, EXTENDED RELEASE ORAL 2 TIMES DAILY
Qty: 14 TABLET | Refills: 0 | Status: SHIPPED | OUTPATIENT
Start: 2023-11-04 | End: 2023-11-11

## 2023-11-04 RX ORDER — FLUTICASONE PROPIONATE 50 MCG
2 SPRAY, SUSPENSION (ML) NASAL DAILY
Qty: 16 G | Refills: 0 | Status: SHIPPED | OUTPATIENT
Start: 2023-11-04 | End: 2023-11-14

## 2024-09-15 ENCOUNTER — E-VISIT (OUTPATIENT)
Dept: FAMILY MEDICINE CLINIC | Facility: TELEHEALTH | Age: 34
End: 2024-09-15
Payer: MEDICAID

## 2024-09-15 DIAGNOSIS — B37.9 ANTIBIOTIC-INDUCED YEAST INFECTION: Primary | ICD-10-CM

## 2024-09-15 DIAGNOSIS — T36.95XA ANTIBIOTIC-INDUCED YEAST INFECTION: Primary | ICD-10-CM

## 2024-09-15 RX ORDER — FLUCONAZOLE 150 MG/1
TABLET ORAL
Qty: 2 TABLET | Refills: 0 | Status: SHIPPED | OUTPATIENT
Start: 2024-09-15

## 2024-09-15 NOTE — E-VISIT TREATED
Date: 09/15/2024 11:23:04  Clinician: Ladi Dias  Clinician NPI: [id number]  Patient: Robbie Box  Patient : 1990  Patient Address: [address] [place] [address]  Patient Phone: [phone]  Visit Protocol: URI  Patient Summary:  Robbie is a 34 year old ( : 1990 ) female who initiated a visit for cold, sinus infection, or influenza.     Robbie states the symptoms started gradually 6 days ago. After the symptoms started, they improved and then got worse   again.   Symptom start date: 2024   The symptoms consist of facial pain or pressure, a cough, malaise, nasal congestion, rhinitis, ear pain, and a headache.   Symptom details     Nasal secretions: The color of the mucus is blood-tinged, green, and white.    Cough: Robbie coughs a few times an hour and the cough is more bothersome at night. Phlegm comes into the throat when coughing. Robbie believes the cough is caused by post-nasal drip. The color of the phlegm is blood-tinged.     Facial pain or pressure: The facial pain or pressure feels worse when bending over or leaning forward.     Headache: The headache is moderate (4-6 on a 10 point pain scale).      Robbie denies having vomiting, sore throat, diarrhea, anosmia, nausea, teeth pain, chills, fever, ageusia, wheezing, and myalgias. Robbie also denies having recent facial or sinus surgery in the past 60 days and taking antibiotic medication in the past   month. Robbie is not experiencing dyspnea.   Precipitating events  Robbie has recently been exposed to someone with influenza. Robbie has not been in close contact with any high risk individuals.    Robbie has received the influenza vaccine within the last 2   weeks.   Pertinent COVID-19 (Coronavirus) information  Since the symptoms started, Robbie has tested for COVID-19. The result of the test was negative.   Robbie has not had COVID-19 in the last 3 months.   Robbie has received a COVID-19 vaccine in the past   year.     Pertinent medical  history    Robbie reports having the following conditions:   Tuberculosis   Robbie has asthma. Robbie uses quick-relief inhaler less than two times per week and wakes up at night with asthma symptoms less than two times per month.   Robbie refills the quick-relief inhaler less than two times per year.   Robbie had 2 sinus infections within the past year.   Robbie typically gets a yeast infection when an antibiotic is taken. Robbie has successfully used Diflucan to treat previous yeast   infections. 2 doses of fluconazole (Diflucan) has typically been needed for symptoms to resolve in the past.  A provider has not told Robbie to avoid NSAIDs.   Robbie does not have diabetes. Robbie denies having immunosuppressive conditions (e.g.,   chemotherapy, HIV, organ transplant, long-term use of steroids or other immunosuppressive medications, splenectomy).   Robbie does not smoke or use smokeless tobacco. Robbie does not vape or use other e-cigarette products.   Robbie denies pregnancy and denies   breastfeeding.   Additional information as reported by the patient (free text): Prednisone and Zpack will need Diflucan for yeast infection...and nose spray generally helps me   Weight: 149 lbs (67.59 kg)    MEDICATIONS: spironolactone oral, Mirena intrauterine, fluticasone propionate nasal, topiramate oral, lisdexamfetamine oral, methylprednisolone oral, ALLERGIES: sulfasalazine, erythromycin base, ciprofloxacin, amoxicillin, Penicillins  Clinician Response:  Dear Robbie,  Based on the information provided, you have acute bacterial sinusitis, also known as a sinus infection. Sinus infections are caused by bacteria or a virus and symptoms are almost always identical. The difference between   the 2 types of infections is timing.  Sinus infections start as viral infections and symptoms improve on their own in about 7 days. A bacterial infection may have developed if any of the following apply to you:     You have had 7 days of symptoms and are experiencing at  least 2 of the following:       Fever    Facial pressure or headache    Green or yellow nasal mucus    Symptoms that improved and then got worse again       You have had symptoms for 10 or more days     Medication information  I am prescribing:       Fluticasone 50 mcg/actuation nasal spray. Inhale 2 sprays in each nostril 1 time per day; after 1 week, may adjust to 1 - 2 sprays in each nostril 1 time per day. This medication takes several days to start working, so keep taking it even if it doesn't   help right away. There are no refills with this prescription.      Doxycycline hyclate 100 mg oral tablet. Take 1 tablet by mouth 2 times per day for 7 days. There are no refills with this prescription.      Brompheniramine-pseudoeph-DM (Bromfed DM) 2-30-10 mg/5mL oral syrup. Take 10 milliliters by mouth every 4 hours as needed for cough. Do not take more than 60mL in 24 hours. There are no refills with this prescription.     Certain antibiotics, such as doxycycline, levofloxacin, and moxifloxacin, can cause your skin to be more sensitive to the sun. Exposure to the sun when taking these antibiotics may cause a skin rash, itching, redness in lighter skin tones,   discoloration in darker skin tones, or a severe sunburn. Be sure to avoid direct exposure to the sun, even for short periods of time, especially between 10 am and 3 pm if possible. Protective clothing from the sun and a broad-spectrum sunscreen is   recommended for all skin types when outdoors.   We recommend a water-resistant, tinted, mineral, broad-spectrum sunscreen, SPF 30 or higher, that contains iron oxide or titanium dioxide. Reapply every 2 hours. Sunscreens that also contain antioxidants   can reduce your risk of sun damage and will enhance the sunscreen's effects providing additional protection for your skin. For more information, please visit the following link:  Sunscreen education  Yeast infections can be a common side effect of   antibiotics. The  most common symptom of a yeast infection is itchiness in and around the vagina. Other signs and symptoms include burning, redness of the vulva (the outer part of the female genitals), or a thick, white vaginal discharge that looks like   cottage cheese and does not have a bad smell.  If you become pregnant during this course of treatment, stop taking the medication and contact your primary care provider.  Self care  Steps you can take to be as comfortable as possible:     Rest.    Drink plenty of fluids.    Take a warm shower to loosen congestion    Use a cool-mist humidifier.    Take a spoonful of honey to reduce your cough.     When to seek care  Please be seen in a clinic or urgent care if any of the following occur:     New symptoms develop, or symptoms become worse    Symptoms do not start to improve after 3 days of treatment     It is possible to have an allergic reaction to an antibiotic even if you have not had one in the past. If you notice a new rash, significant swelling, or difficulty breathing, stop taking this medication immediately and go to a clinic or urgent care.    For the latest updates on COVID-19 (Coronavirus), please visit the Centers for Disease Control and Prevention (CDC). Also, your state and local health department websites may provide additional guidance regarding testing and isolation recommendations for   your location.   Diagnosis: Acute bacterial sinusitis  Diagnosis ICD: J01.90    Follow up instructions:  ATTENTION: If you have been prescribed medications, your prescriptions will not be sent until you choose your pharmacy. To do so open the link within your notification, or go to [employer] and click eVisit in the menu to open your   treatment plan. From there, you can select your pharmacy at the bottom of your after visit summary. You can also go to [email or web address]   Prescriptions  Prescription: doxycycline hyclate 100 mg oral tablet, take 1 tablet  by mouth 2 times per day for 7 days  Sent To: [facility] [id number] - [phone] - [id number] [place] [address]  Prescription: brompheniramine-pseudoeph-DM (Bromfed DM) 2-30-10 mg/5 mL oral syrup, take 10 milliliters by mouth every 4 hours as needed for cough  Sent To: [facility] [id number] - [id number] - [id number],  [place] [address]  Prescription: fluticasone 50 mcg/actuation nasal spray,suspension, inhale 2 sprays in each nostril 1 time per day; after 1 week, may adjust to 1 - 2 sprays in each nostril 1 time per day.  Sent To: [facility] [id number] - [id number] - [id number] [place] [address]

## 2024-12-29 ENCOUNTER — E-VISIT (OUTPATIENT)
Dept: FAMILY MEDICINE CLINIC | Facility: TELEHEALTH | Age: 34
End: 2024-12-29
Payer: MEDICAID

## 2024-12-29 NOTE — E-VISIT TREATED
Date: 2024 08:16:05  Clinician: Ladi Dias  Clinician NPI: [id number]  Patient: Robbie Box  Patient : 1990  Patient Address: [address] [place], [place] [address]  Patient Phone: [phone]  Visit Protocol: URI  Patient Summary:  Robbie is a 34 year old ( : 1990 ) female who initiated a visit for cold, sinus infection, or influenza.     Robbie states the symptoms started gradually 7-9 days ago.   Symptom start date:    The symptoms consist of facial   pain or pressure, a sore throat, nasal congestion, nausea, malaise, ear pain, rhinitis, a cough, and a headache. Robbie is experiencing difficulty breathing due to nasal congestion but is not short of breath.   Symptom details     Nasal secretions: The color of the mucus is blood-tinged and green.    Cough: Robbie coughs a few times an hour and the cough is more bothersome at night. Phlegm comes into the throat when coughing. Robbie believes the cough is caused by post-nasal drip. The color of the phlegm is blood-tinged, yellow, and green.     Sore throat: Robbie reports having mild throat pain (1-3 on a 10 point pain scale), does not have exudate on the tonsils, and can swallow liquids without any difficulty. The lymph nodes in the neck are not enlarged. A rash has not appeared on the skin   since the sore throat started.     Facial pain or pressure: The facial pain or pressure does not feel worse when bending or leaning forward.     Headache: The headache is moderate (4-6 on a 10 point pain scale).      Robbie denies having anosmia and ageusia, diarrhea, teeth pain, wheezing, chills, enlarged lymph nodes, myalgias, vomiting, and fever. Robbie also denies taking antibiotic medication in the past month, having recent facial or sinus surgery in the past 60   days, and double sickening (worsening symptoms after initial improvement).   Precipitating events  Within the past week, Robbie has not been exposed to someone with strep throat. Robbie has not  recently been exposed to someone with influenza. Robbie has not   been in close contact with any high risk individuals.    Robbie has received the influenza vaccine more than 2 weeks ago.   Pertinent COVID-19 (Coronavirus) information  Since the symptoms started, Robbie has tested for COVID-19. The result of the test was   negative.   Robbie has not had COVID-19 in the last 3 months.   Robbie has not received a COVID-19 vaccine in the past year.   Pertinent medical history    Robbie reports having the following conditions:   Tuberculosis   Robbie has asthma. Robbie uses quick-relief   inhaler less than two times per week and wakes up at night with asthma symptoms less than two times per month. Robbie refills the quick-relief inhaler less than two times per year.   Robbie had 2 sinus infections within the past year.   Robbie typically gets   a yeast infection when an antibiotic is taken. Robbie has successfully used Diflucan to treat previous yeast infections. 2 doses of fluconazole (Diflucan) has typically been needed for symptoms to resolve in the past.  A provider has not told Robbie to avoid   NSAIDs.   Robbie does not have diabetes. Robbie denies having immunosuppressive conditions (e.g., chemotherapy, HIV, organ transplant, long-term use of steroids or other immunosuppressive medications, splenectomy).   Robbie does not smoke or use smokeless   tobacco. Robbie does not vape or use other e-cigarette products.   Robbie denies pregnancy and denies breastfeeding.   Additional information as reported by the patient (free text): Usually take zpack for this.  If i am prescribed anything else I know I will   need something for yeast infection-- my ears are really bothersome they feel wet or full I can't really describe it but I always feel the need to get them to drain or unclog in the morning- under eye area looks like dark and swollen like a bruise from   im assuming a sinus infection have done netipot with little relief and am taking OTC sinus  medication and mucinex and allergy medication til I finally came here.   Weight: 152 lbs (68.95 kg)    MEDICATIONS: spironolactone oral, Mirena intrauterine, topiramate oral, lisdexamfetamine oral, ALLERGIES: Penicillins, amoxicillin, sulfasalazine, erythromycin base, ciprofloxacin  Clinician Response:  Eulalia Avalos,  Based on the information provided, you have acute bacterial sinusitis, also known as a sinus infection. Sinus infections are caused by bacteria or a virus and symptoms are almost always identical. The difference between   the 2 types of infections is timing.  Sinus infections start as viral infections and symptoms improve on their own in about 7 days. A bacterial infection may have developed if any of the following apply to you:     You have had 7 days of symptoms and are experiencing at least 2 of the following:       Fever    Facial pressure or headache    Green or yellow nasal mucus    Symptoms that improved and then got worse again       You have had symptoms for 10 or more days     Medication information  I am prescribing:       Fluticasone 50 mcg/actuation nasal spray. Inhale 2 sprays in each nostril 1 time per day; after 1 week, may adjust to 1 - 2 sprays in each nostril 1 time per day. This medication takes several days to start working, so keep taking it even if it doesn't   help right away. There are no refills with this prescription.      Azithromycin (Zithromax Z-Emeka) 250 mg oral tablet. Take 2 tablets by mouth on day 1, then 1 tablet by mouth on days 2-5. There are no refills with this prescription.      Brompheniramine-pseudoeph-DM (Bromfed DM) 2-30-10 mg/5mL oral syrup. Take 10 milliliters by mouth every 4 hours as needed for cough. Do not take more than 60mL in 24 hours. There are no refills with this prescription.     Yeast infections can be a common side effect of antibiotics. The most common symptom of a yeast infection is itchiness in and around the vagina. Other signs and symptoms  include burning, redness of the vulva (the outer part of the female genitals), or   a thick, white vaginal discharge that looks like cottage cheese and does not have a bad smell.  If you become pregnant during this course of treatment, stop taking the medication and contact your primary care provider.  Tips for using a nasal spray:     When the medication is being sprayed in your nose, point the tip of the nasal spray towards your ear.    Do not blow your nose after using the spray.    Do not lean your head back after using the spray as it will go down your throat.    Wipe the tip of the nose piece after use with a dry, clean tissue.     Self care  Steps you can take to be as comfortable as possible:     Rest.    Drink plenty of fluids.    Take a warm shower to loosen congestion.    Use a cool-mist humidifier.    Use throat lozenges.    Suck on frozen items such as popsicles.    Drink hot tea with lemon and honey.    Gargle with warm salt water (1/4 teaspoon of salt per 8 ounce glass of water).    Take a spoonful of honey to reduce your cough.     When to seek care  Please be seen in a clinic or urgent care if any of the following occur:     New symptoms develop, or symptoms become worse    Symptoms do not start to improve after 3 days of treatment     Call 911 or go to the emergency room if any of the following occur:     Difficulty breathing    If you feel that your throat is closing off    Suddenly develop a rash    Unable to swallow fluids or are drooling     It is possible to have an allergic reaction to an antibiotic even if you have not had one in the past. If you notice a new rash, significant swelling, or difficulty breathing, stop taking this medication immediately and go to a clinic or urgent care.    For the latest updates on COVID-19 (Coronavirus), please visit the Centers for Disease Control and Prevention (CDC). Also, your state and local health department websites may provide additional guidance  regarding testing and isolation recommendations for   your location.   Diagnosis: Acute bacterial sinusitis  Diagnosis ICD: J01.90    Follow up instructions:  ATTENTION: If you have been prescribed medications, your prescriptions will not be sent until you choose your pharmacy. To do so open the link within your notification, or go to [employer] and click eVisit in the menu to open your   treatment plan. From there, you can select your pharmacy at the bottom of your after visit summary. You can also go to [email or web address]   Prescriptions  Prescription: azithromycin (Zithromax Z-Emeka) 250 mg oral tablet, take 2 tablets by mouth on day 1, then 1 tablet by mouth on days 2-5  Sent To: [facility] [id number] - [id number] - [address] [place] [address]  Prescription: fluticasone 50 mcg/actuation nasal spray,suspension, inhale 2 sprays in each nostril 1 time per day; after 1 week, may adjust to 1 - 2 sprays in each nostril 1 time per day.  Sent To: [facility] [id number] - [id number] - [address] [place] [address]  Prescription: brompheniramine-pseudoeph-DM (Bromfed DM) 2-30-10 mg/5 mL oral syrup, take 10 milliliters by mouth every 4 hours as needed for cough  Sent To: [facility] [id number] - [id number] - [address] [place] [address]

## 2025-02-11 ENCOUNTER — E-VISIT (OUTPATIENT)
Dept: FAMILY MEDICINE CLINIC | Facility: TELEHEALTH | Age: 35
End: 2025-02-11
Payer: MEDICAID

## 2025-02-11 PROCEDURE — FABRICHEALTHVISIT: Performed by: NURSE PRACTITIONER

## 2025-02-11 NOTE — E-VISIT TREATED
Date: 2025 17:39:02  Clinician: Mariana Kay  Clinician NPI: [id number]  Patient: Robbie Box  Patient : 1990  Patient Address: [address] [place], [place] [address]  Patient Phone: [phone]  Visit Protocol: Oral mouth sores  Patient Summary:  Robbie is a 34 year old ( : 1990 ) female who initiated a visit for cold sores.     Images of the skin condition were uploaded.   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   Symptom details  Robbie has   active sores. The symptoms started today.   The sores are located outside the mouth, near the lips and on the lips on one side of the face. The sores are located together, in a cluster. Robbie has 1 to 2 sores.   Robbie denies having tingling, burning, and   pain around the affected area. Robbie also denies feeling feverish. Robbie is not experiencing similar rash or sores on other parts of the body.   Precipitating events  Robbie did not experience pain or unusual sensations (such as itching, burning, or   tingling) in the location of sores/rash before it appeared.   Pertinent medical history  Skin tone as reported by Robbie: (Type 2) pale or fair natural skin pigmentation, neutral or pink undertone   The last outbreak was over 3 months ago. The sores are   typically non-recurrent.   Robbie has taken prescription medications for treating the sores in the past. The following medications were effective in treating the sores: Valacyclovir (Valtrex) pills. Robbie has not taken oral antivirals for long-term   suppression of the oral sores in the past 12 months.   Robbie does not have diabetes. Robbie also denies having immunosuppressive conditions (e.g., chemotherapy, HIV, organ transplant, long-term use of steroids or other immunosuppressive medications,   splenectomy).   Robbie does not smoke or use smokeless tobacco. Robbie does not vape or use other e-cigarette products.   Robbie denies pregnancy and denies breastfeeding.    Additional information as reported by the patient (free text): I am just getting over   a mix of the flu and upper respiratory infection which I think triggered the cold sores. I applied some abreva this morning.     MEDICATIONS: spironolactone oral, Mirena intrauterine, topiramate oral, lisdexamfetamine oral, ALLERGIES: Penicillins, amoxicillin, sulfasalazine, erythromycin base, ciprofloxacin  Clinician Response:  uElalia Avalos,  Based on the information provided, the lesions or sores you have are most likely cold sores, also known as fever blisters.  The technical term is 'herpes simplex virus type 1'. Common symptoms include a tingling sensation   on the mouth or lips, fluid filled blisters, or crusting on the skin.  A person who has this condition can transmit the infection to others through direct contact. After you have had cold sores, the virus remains dormant in your skin cells and can come   back at a later time causing another cold sore.   Medication information  I am prescribing:     Valacyclovir 1 gram oral tablet. Take 2 tablets by mouth every 12 hours for 1 day. If you think your oral sores are returning, refill the medication and use it according to the instructions. Your prescription includes 1 refill.   If you become pregnant   during this course of treatment with medication, stop taking the medication and contact your primary care provider.   Self care  To reduce the spread of the sores to other people and to other parts of your body, please take the following precautions:      Try not to itch or scratch the sores    If the lesions are draining, keep them covered    Be sure to wash your hands with soap and water often and after touching the sores    Wash towels and bed linens in hot water and dry them on high heat    When you have active sores:    Avoid direct contact such as kissing    Do not share silverware, cups, or towels           When to seek care  Please make an appointment to be seen in a  clinic or urgent care if any of the following occur:     Your sores do not heal within 2 weeks    You develop new symptoms or your symptoms become worse      Diagnosis: Herpes Simplex (Cold Sores)  Diagnosis ICD: B00.9    Follow up instructions: ATTENTION: If you have been prescribed medications, your prescriptions will not be sent until you choose your pharmacy.  To do so open the link within your notification, or go to [employer] and click eVisit in the menu to open your   treatment plan. From there, you can select your pharmacy at the bottom of your after visit summary. You can also go to [email or web address]  Prescriptions  Prescription: valacyclovir (Valtrex) 1 gram oral tablet, take 2 tablets by mouth every 12 hours for 1 day  Sent To: [id number] - [id number] - [address] [place] [address]

## 2025-03-27 ENCOUNTER — E-VISIT (OUTPATIENT)
Dept: ADMINISTRATIVE | Facility: OTHER | Age: 35
End: 2025-03-27
Payer: MEDICAID

## 2025-03-27 ENCOUNTER — E-VISIT (OUTPATIENT)
Dept: FAMILY MEDICINE CLINIC | Facility: TELEHEALTH | Age: 35
End: 2025-03-27
Payer: MEDICAID

## 2025-03-28 NOTE — E-VISIT ESCALATED
Status: Referred Out  Date: 2025 19:59:44  Acuity Level: Within 24 hours  Referral message:  We're sorry you are not feeling well. Your safety is important to us. The color of your discharge is not typical of bacterial vaginosis, so it is possible you have another condition. For this reason, we would like to see you in person   to determine the cause of your symptoms and the most effective treatment for you.  For the most appropriate care, please be seen:   At a clinic or an urgent care  Within 24 hours   You won't be charged for this visit.&nbsp;We hope you feel better soon!   Patient: Robbie Box  Patient : 1990  Patient Address: [address] [place] [address]  Patient Phone: [phone]  Clinician Response: Unavailable  Diagnosis: Unavailable  Diagnosis ICD: Unavailable     Patient Interview Questions and Responses:  Clinical Protocol: Bacterial vaginosis  Please select the reason for your visit today.: Bacterial vaginosis  When did your symptoms start?: 3-6 days ago  Do you have any of the following symptoms? Unusual vaginal discharge: Yes  Do you have any of the following symptoms? Itching in the vaginal area: No  Do you have any of the following symptoms? Lesions or sores outside the vaginal area : No  Do you have any of the following symptoms? Burning during urination: No  Do you have any of the following symptoms? Increased urgency to urinate (risk of leakage and uncontrollable urination): No  Do you have any of the following symptoms? Increased urinary frequency (urinating eight or more times a day): Yes  What color is your discharge?: Red or bloody

## 2025-03-28 NOTE — E-VISIT ESCALATED
Date: 2025 21:09:09  Clinician: Nicolle Pop  Clinician NPI: [id number]  Patient: Robbie Box  Patient : 1990  Patient Address: [address] [place], [place] [address]  Patient Phone: [phone]  Visit Protocol: Bacterial vaginosis  Patient Summary:  Robbie is a 35 year old ( : 1990 ) female who initiated a visit for bacterial vaginosis.     Robbie began noticing increased urinary frequency and vaginal discharge 3-6 days ago. The discharge has a fishy smell.    Symptom details     Vaginal discharge: The discharge is gray in color and is thin in appearance.    Robbie denies having pain during or after sex, increased urgency to urinate, vaginal burning, vaginal pruritus, abdominal pain, pelvic pain, genital lesions, and bleeding   during or after sex. Robbie also denies feeling feverish.   Robbie has attempted to treat current symptoms with Metronidazole.   Precipitating events   Robbie reports they have been sexually active and have not had new or multiple partners in the past 90 days.     Robbie reports their partner(s) have not experienced genital symptoms in the past 90 days.   Robbie denies the use of vaginal lubricants, the use of vaginal hygiene products, and having an IUD placed in the last 60 days.   Pertinent medical history  Robbie   has a history of bacterial vaginosis. Robbie has had 0 occurrences in the past year. Metronidazole (Flagyl) was used to treat previous bacterial vaginosis episode. The metronidazole was helpful.   Robbie denies taking antibiotics in the past 2 weeks.   Preferred medication route: Pill   Robbie denies having a sexually transmitted infection.   Robbie has had a yeast infection in the past.   STI testing status: Last tested 0-1 year ago and reports they do not have an STI.   Robbie does not have diabetes.     Robbie has not had pelvic surgery or a procedure in the past 6 weeks.   Robbie denies having immunosuppressive conditions (e.g., chemotherapy, HIV, organ transplant,  long-term use of steroids or other immunosuppressive medications, splenectomy).   Robbie does   not smoke or use smokeless tobacco. Robbie does not vape or use other e-cigarette products.   Robbie denies pregnancy and denies breastfeeding.   Additional information as reported by the patient (free text): Can I have yeast medication called in with this   as it always gives me one and for some reason it always takes 2 doses to clear it out .   Reason for repeat visit for the same protocol within 24 hours:  I clicked the wrong button for color of discharge ! It's grayish white and cloudy strong fishy smell    See the History of referred by protocol and completed visits section for details on previous visits (visits currently in queue to be diagnosed will not appear in this section).    MEDICATIONS: spironolactone oral, Mirena intrauterine, topiramate oral, lisdexamfetamine oral, ALLERGIES: Penicillins, amoxicillin, sulfasalazine, erythromycin base, ciprofloxacin  Clinician Response:  Dear Robbie,  Your health is our priority. To determine the most appropriate care for you, I would like you to be seen in person to further discuss your health history and symptoms.  You will not be charged for this visit. Thank you   for trusting us with your care.   Diagnosis: Refer for additional evaluation  Diagnosis ICD: R69  Additional Clinician Notes:  you will need to be seen in person for symptoms. you will need to have a vaginal swab and possible urinalysis to diagnose your symptoms.

## 2025-05-15 ENCOUNTER — E-VISIT (OUTPATIENT)
Dept: FAMILY MEDICINE CLINIC | Facility: TELEHEALTH | Age: 35
End: 2025-05-15
Payer: MEDICAID

## 2025-05-16 NOTE — E-VISIT ESCALATED
Date: 05/15/2025 21:14:31  Clinician: Shannan Whyte  Clinician NPI: [id number]  Patient: Robbie Box  Patient : 1990  Patient Address: [address] [place], [place] [address]  Patient Phone: [phone]  Visit Protocol: URI  Patient Summary:  Robbie is a 35 year old ( : 1990 ) female who initiated a visit for cold, sinus infection, or influenza.     Robbie states the symptoms started gradually 6 days ago.   Symptom start date: 2025   The symptoms consist of   myalgia, enlarged lymph nodes, a sore throat, a headache, a cough, nasal congestion, malaise, and ear pain.   Symptom details     Nasal secretions: The color of the mucus is green and yellow.    Cough: Robbie coughs a few times an hour and the cough is more bothersome at night. Phlegm comes into the throat when coughing. Robbie does not believe the cough is caused by post-nasal drip. The color of the phlegm is green and yellow.     Sore throat: Robbie reports having moderate throat pain (4-6 on a 10 point pain scale), does not have exudate on the tonsils, and can swallow liquids with mild discomfort. The lymph nodes in the neck are enlarged. The lymph node swelling is worse on one   side and the swelling has caused changes in speech or hoarseness in the voice. A rash has not appeared on the skin since the sore throat started. Robbie reports experiencing difficulty opening their mouth due to pain or swelling in the jaw muscles. Patient   also reports feeling pain in the front of the neck that sometimes moves to the ear.     Headache: The headache is moderate (4-6 on a 10 point pain scale).      Robbie denies having teeth pain, fever, anosmia and ageusia, wheezing, facial pain or pressure, chills, diarrhea, nausea, rhinitis, and vomiting. Robbie also denies having recent facial or sinus surgery in the past 60 days, taking antibiotic medication in   the past month, having a sinus infection within the past year, and double sickening (worsening  symptoms after initial improvement). Robbie is not experiencing dyspnea.   Precipitating events  Within the past week, Robbie has not been exposed to someone with   strep throat. Robbie has not recently been exposed to someone with influenza. Robbie has not been in close contact with any high risk individuals.    Robbie has received the influenza vaccine more than 2 weeks ago.   Pertinent COVID-19 (Coronavirus)   information  Since the symptoms started, Robbie has tested for COVID-19. The result of the test was negative. The negative result was within the last 48 hours.   Robbie has not received a COVID-19 vaccine in the past year.   Pertinent medical history    Robbie   reports having the following conditions:   Tuberculosis    Robbie typically gets a yeast infection when an antibiotic is taken. Robbie has successfully used Diflucan to treat previous yeast infections. 2 doses of fluconazole (Diflucan) has typically been   needed for symptoms to resolve in the past.  A provider has not told Robbie to avoid NSAIDs.   Robbie does not have diabetes. Robbie denies having immunosuppressive conditions (e.g., chemotherapy, HIV, organ transplant, long-term use of steroids or other   immunosuppressive medications, splenectomy). Robbie does not have asthma.   Robbie does not smoke or use smokeless tobacco. Robbie does not vape or use other e-cigarette products.   Robbie denies pregnancy and denies breastfeeding.   Additional information as   reported by the patient (free text): Doxycycline is not preferred- gives me very bad diarrhea and upset stomach.   Weight: 149 lbs (67.59 kg)    MEDICATIONS: spironolactone oral, Mirena intrauterine, topiramate oral, lisdexamfetamine oral, ALLERGIES: Penicillins, amoxicillin, sulfasalazine, erythromycin base, ciprofloxacin  Clinician Response:  Dear Robbie,  I am sorry you are not feeling well. To determine the most appropriate care for you, I would like you to be seen in person to further discuss your health history and  symptoms.  You will not be charged for this visit.   Thank you for trusting us with your care.  For the latest updates on COVID-19 (Coronavirus), please visit the Centers for Disease Control and Prevention (CDC). Also, your state and local health department websites may provide additional guidance   regarding testing and isolation recommendations for your location.   Diagnosis: Refer for additional evaluation  Diagnosis ICD: R69  Additional Clinician Notes:  I feel that you need to be seen in person

## 2025-06-28 ENCOUNTER — E-VISIT (OUTPATIENT)
Dept: FAMILY MEDICINE CLINIC | Facility: TELEHEALTH | Age: 35
End: 2025-06-28
Payer: MEDICAID

## 2025-06-28 DIAGNOSIS — J06.9 UPPER RESPIRATORY TRACT INFECTION, UNSPECIFIED TYPE: Primary | ICD-10-CM

## 2025-06-28 RX ORDER — ONDANSETRON 4 MG/1
4 TABLET, ORALLY DISINTEGRATING ORAL EVERY 8 HOURS PRN
Qty: 9 TABLET | Refills: 0 | Status: SHIPPED | OUTPATIENT
Start: 2025-06-28

## 2025-06-28 RX ORDER — PSEUDOEPHEDRINE HCL 120 MG/1
120 TABLET, FILM COATED, EXTENDED RELEASE ORAL EVERY 12 HOURS
Qty: 20 TABLET | Refills: 0 | Status: SHIPPED | OUTPATIENT
Start: 2025-06-28

## 2025-06-28 NOTE — E-VISIT TREATED
Date: 2025 08:42:44  Clinician: Harriet Forrester  Clinician NPI: [id number]  Patient: Robbie Box  Patient : 1990  Patient Address: [address] [place], [place] [address]  Patient Phone: [phone]  Visit Protocol: URI  Patient Summary:  Robbie is a 35 year old ( : 1990 ) female who initiated a visit for cold, sinus infection, or influenza.     Robbie states the symptoms started gradually 3-5 days ago.   Symptom start date: 2025   The symptoms consist of   nausea, rhinitis, a cough, facial pain or pressure, a headache, nasal congestion, ear pain, and malaise.   Symptom details     Nasal secretions: The color of the mucus is green and clear.    Cough: Robbie coughs a few times an hour and the cough is more bothersome at night. Phlegm comes into the throat when coughing. Robbie believes the cough is caused by post-nasal drip. The color of the phlegm is white and green.     Facial pain or pressure: The facial pain or pressure feels worse when bending over or leaning forward.     Headache: The headache is moderate (4-6 on a 10 point pain scale).      Robbie denies having teeth pain, wheezing, vomiting, sore throat, myalgias, chills, diarrhea, fever, and anosmia and ageusia. Robbie also denies taking antibiotic medication in the past month, having recent facial or sinus surgery in the past 60 days,   double sickening (worsening symptoms after initial improvement), and having a sinus infection within the past year. Robbie is not experiencing dyspnea.   Precipitating events  Robbie has not recently been exposed to someone with influenza. Robbie has not been   in close contact with any high risk individuals.    Robbie has received the influenza vaccine more than 2 weeks ago.   Pertinent COVID-19 (Coronavirus) information  Since the symptoms started, Robbie has tested for COVID-19. The result of the test was   negative. The negative result was within the last 48 hours.   Robbie has not received a COVID-19  vaccine in the past year.   Pertinent medical history     Robbie typically gets a yeast infection when an antibiotic is taken. Robbie has successfully used   Diflucan to treat previous yeast infections. 2 doses of fluconazole (Diflucan) has typically been needed for symptoms to resolve in the past.  A provider has not told Robbie to avoid NSAIDs.   Robbie does not have diabetes. Robbie denies having   immunosuppressive conditions (e.g., chemotherapy, HIV, organ transplant, long-term use of steroids or other immunosuppressive medications, splenectomy). Robbie does not have asthma.   Robbie denies having chronic lung disease, cystic fibrosis, hypertension,   long-term disabilities, mental health conditions, sickle cell disease or thalassemia, stroke or other cardiovascular disease, substance use disorders, or tuberculosis (TB).  Robbie does not smoke or use smokeless tobacco. Robbie does not vape or use other   e-cigarette products.   Robbie denies pregnancy and denies breastfeeding.   Additional information as reported by the patient (free text): If possible could I get Zofran for the nausea from all the post nasal drip   Weight: 148 lbs (67.13 kg)    MEDICATIONS: spironolactone oral, Mirena intrauterine, topiramate oral, lisdexamfetamine oral, ALLERGIES: Penicillins, amoxicillin, sulfasalazine, erythromycin base, ciprofloxacin, doxycycline  Clinician Response:  Dear Robbie,  Based on the information provided, you have a viral upper respiratory infection, otherwise known as a cold. Symptoms vary from person to person, but can include sneezing, coughing, a runny nose, sore throat, and headache   and range from mild to severe.  Unfortunately, there are no medications that can cure a cold, so treatment is focused on controlling symptoms as much as possible. Most people gradually feel better until symptoms are gone in 1-2 weeks.  Medication   information  Because you have a viral infection, antibiotics will not help you get better. Treating a  viral infection with antibiotics could actually make you feel worse.  For more information on why I am not prescribing antibiotics, please click here   for more information: Antibiotics Aren't Always the Answer.  I am prescribing:     Benzonatate 200 mg oral capsule. Take 1 capsule 3 times a day as needed for cough. There are no refills with this prescription.   Unless you are allergic to the over-the-counter medication(s) below, I recommend using:     Fluticasone 50 mcg/actuation nasal spray. Spray 2 times in each nostril 1 time per day; after 1 week, may adjust to 1 - 2 sprays in each nostril 1 time per day. This medication takes several days to start working, so keep taking it even if it doesn't   help right away.   Over-the-counter medications do not require a prescription. Ask the pharmacist if you have any questions.  Tips for using a nasal spray:     When the medication is being sprayed in your nose, point the tip of the nasal spray towards your ear.    Do not blow your nose after using the spray.    Do not lean your head back after using the spray as it will go down your throat.    Wipe the tip of the nose piece after use with a dry, clean tissue.     Self care  Steps you can take to be as comfortable as possible:     Rest.    Drink plenty of fluids.    Take a warm shower to loosen congestion.    Use a cool-mist humidifier.    Take a spoonful of honey to reduce your cough.     When to seek care  Please be seen in a clinic or urgent care if any of the following occur:   New symptoms develop, or symptoms become worse   Call 911 or go to the emergency room if any of the following occur:     If you feel that your throat is closing off    Suddenly develop a rash    Unable to swallow fluids or are drooling     For the latest updates on COVID-19 (Coronavirus), please visit the Centers for Disease Control and Prevention (CDC). Also, your state and local health department websites may provide additional guidance  regarding testing and isolation recommendations   for your location.   Diagnosis: Viral URI  Diagnosis ICD: J06.9    Follow up instructions: ATTENTION: If you have been prescribed medications, your prescriptions will not be sent until you choose your pharmacy.  To do so open the link within your notification, or go to [employer] and click eVisit in the menu to open your   treatment plan. From there, you can select your pharmacy at the bottom of your after visit summary. You can also go to [email or web address]  Prescriptions  Prescription: pseudoephedrine HCl (Sudafed 12 Hour) 120 mg oral tablet extended release, take 1 tablet by mouth every 12 hours for 7 days  Sent To: [facility] [id number] - [id number] - [id number] [place] [address]  Prescription: benzonatate 200 mg oral capsule, take 1 capsule 3 times a day as needed for cough  Sent To: [facility] [id number] - [id number] - [id number] [place] [address]  Prescription: ondansetron 4 mg oral tablet,disintegrating, place 1 tablet on top of the tongue where they will dissolve,then swallow 3 times per day for 3 days  Sent To: [facility] [id number] - [id number] - [id number] [place] [address]

## 2025-07-01 DIAGNOSIS — J01.40 ACUTE PANSINUSITIS, RECURRENCE NOT SPECIFIED: Primary | ICD-10-CM

## 2025-07-01 RX ORDER — DOXYCYCLINE 100 MG/1
100 CAPSULE ORAL 2 TIMES DAILY
Qty: 10 CAPSULE | Refills: 0 | Status: SHIPPED | OUTPATIENT
Start: 2025-07-01

## 2025-07-01 RX ORDER — AZITHROMYCIN 250 MG/1
TABLET, FILM COATED ORAL
Qty: 6 TABLET | Refills: 0 | Status: SHIPPED | OUTPATIENT
Start: 2025-07-01

## 2025-07-01 NOTE — PROGRESS NOTES
Spoke with patient on phone and she reports she cannot take doxy (added to allergy list) but can take zpak for symptoms. She has past reaction to erythromycin but has tolerated azithromycin well multiple times. Med sent to [facility] as discussed.